# Patient Record
Sex: MALE | Race: WHITE
[De-identification: names, ages, dates, MRNs, and addresses within clinical notes are randomized per-mention and may not be internally consistent; named-entity substitution may affect disease eponyms.]

---

## 2017-11-16 ENCOUNTER — HOSPITAL ENCOUNTER (OUTPATIENT)
Dept: HOSPITAL 47 - EC | Age: 67
Setting detail: OBSERVATION
LOS: 1 days | Discharge: HOME | End: 2017-11-17
Attending: INTERNAL MEDICINE | Admitting: INTERNAL MEDICINE
Payer: MEDICARE

## 2017-11-16 DIAGNOSIS — Z79.82: ICD-10-CM

## 2017-11-16 DIAGNOSIS — M19.90: ICD-10-CM

## 2017-11-16 DIAGNOSIS — Z80.8: ICD-10-CM

## 2017-11-16 DIAGNOSIS — J45.909: ICD-10-CM

## 2017-11-16 DIAGNOSIS — Z79.899: ICD-10-CM

## 2017-11-16 DIAGNOSIS — M54.5: ICD-10-CM

## 2017-11-16 DIAGNOSIS — E78.00: ICD-10-CM

## 2017-11-16 DIAGNOSIS — R61: ICD-10-CM

## 2017-11-16 DIAGNOSIS — G89.29: ICD-10-CM

## 2017-11-16 DIAGNOSIS — Z88.8: ICD-10-CM

## 2017-11-16 DIAGNOSIS — F41.9: ICD-10-CM

## 2017-11-16 DIAGNOSIS — D72.829: ICD-10-CM

## 2017-11-16 DIAGNOSIS — Z87.891: ICD-10-CM

## 2017-11-16 DIAGNOSIS — Z83.3: ICD-10-CM

## 2017-11-16 DIAGNOSIS — F32.9: ICD-10-CM

## 2017-11-16 DIAGNOSIS — E66.01: ICD-10-CM

## 2017-11-16 DIAGNOSIS — N40.0: ICD-10-CM

## 2017-11-16 DIAGNOSIS — K21.9: ICD-10-CM

## 2017-11-16 DIAGNOSIS — M54.30: ICD-10-CM

## 2017-11-16 DIAGNOSIS — I10: ICD-10-CM

## 2017-11-16 DIAGNOSIS — Z86.73: ICD-10-CM

## 2017-11-16 DIAGNOSIS — R07.89: Primary | ICD-10-CM

## 2017-11-16 DIAGNOSIS — G25.81: ICD-10-CM

## 2017-11-16 DIAGNOSIS — E78.5: ICD-10-CM

## 2017-11-16 LAB
ALP SERPL-CCNC: 49 U/L (ref 38–126)
ALT SERPL-CCNC: 30 U/L (ref 21–72)
ANION GAP SERPL CALC-SCNC: 8 MMOL/L
APTT BLD: 24.4 SEC (ref 22–30)
AST SERPL-CCNC: 21 U/L (ref 17–59)
BASOPHILS # BLD AUTO: 0 K/UL (ref 0–0.2)
BASOPHILS NFR BLD AUTO: 0 %
BUN SERPL-SCNC: 13 MG/DL (ref 9–20)
CALCIUM SPEC-MCNC: 9.1 MG/DL (ref 8.4–10.2)
CH: 30.4
CHCM: 32.7
CHLORIDE SERPL-SCNC: 105 MMOL/L (ref 98–107)
CK SERPL-CCNC: 87 U/L (ref 55–170)
CK SERPL-CCNC: 92 U/L (ref 55–170)
CO2 SERPL-SCNC: 27 MMOL/L (ref 22–30)
EOSINOPHIL # BLD AUTO: 0.1 K/UL (ref 0–0.7)
EOSINOPHIL NFR BLD AUTO: 1 %
ERYTHROCYTE [DISTWIDTH] IN BLOOD BY AUTOMATED COUNT: 4.77 M/UL (ref 4.3–5.9)
ERYTHROCYTE [DISTWIDTH] IN BLOOD: 14.7 % (ref 11.5–15.5)
GLUCOSE SERPL-MCNC: 113 MG/DL (ref 74–99)
HCT VFR BLD AUTO: 44.6 % (ref 39–53)
HDW: 2.43
HGB BLD-MCNC: 14.3 GM/DL (ref 13–17.5)
INR PPP: 1.1 (ref ?–1.2)
LUC NFR BLD AUTO: 1 %
LYMPHOCYTES # SPEC AUTO: 1.4 K/UL (ref 1–4.8)
LYMPHOCYTES NFR SPEC AUTO: 11 %
MAGNESIUM SPEC-SCNC: 1.9 MG/DL (ref 1.6–2.3)
MCH RBC QN AUTO: 30 PG (ref 25–35)
MCHC RBC AUTO-ENTMCNC: 32.1 G/DL (ref 31–37)
MCV RBC AUTO: 93.5 FL (ref 80–100)
MONOCYTES # BLD AUTO: 1.1 K/UL (ref 0–1)
MONOCYTES NFR BLD AUTO: 8 %
NEUTROPHILS # BLD AUTO: 10.5 K/UL (ref 1.3–7.7)
NEUTROPHILS NFR BLD AUTO: 78 %
NON-AFRICAN AMERICAN GFR(MDRD): >60
POTASSIUM SERPL-SCNC: 3.7 MMOL/L (ref 3.5–5.1)
PROT SERPL-MCNC: 6.8 G/DL (ref 6.3–8.2)
PT BLD: 10.9 SEC (ref 9–12)
SODIUM SERPL-SCNC: 140 MMOL/L (ref 137–145)
TROPONIN I SERPL-MCNC: <0.012 NG/ML (ref 0–0.03)
TROPONIN I SERPL-MCNC: <0.012 NG/ML (ref 0–0.03)
WBC # BLD AUTO: 0.18 10*3/UL
WBC # BLD AUTO: 13.4 K/UL (ref 3.8–10.6)
WBC (PEROX): 13.5

## 2017-11-16 PROCEDURE — 85379 FIBRIN DEGRADATION QUANT: CPT

## 2017-11-16 PROCEDURE — 93005 ELECTROCARDIOGRAM TRACING: CPT

## 2017-11-16 PROCEDURE — 80053 COMPREHEN METABOLIC PANEL: CPT

## 2017-11-16 PROCEDURE — 82553 CREATINE MB FRACTION: CPT

## 2017-11-16 PROCEDURE — 93306 TTE W/DOPPLER COMPLETE: CPT

## 2017-11-16 PROCEDURE — 36415 COLL VENOUS BLD VENIPUNCTURE: CPT

## 2017-11-16 PROCEDURE — 96374 THER/PROPH/DIAG INJ IV PUSH: CPT

## 2017-11-16 PROCEDURE — 96375 TX/PRO/DX INJ NEW DRUG ADDON: CPT

## 2017-11-16 PROCEDURE — 80061 LIPID PANEL: CPT

## 2017-11-16 PROCEDURE — 85610 PROTHROMBIN TIME: CPT

## 2017-11-16 PROCEDURE — 82550 ASSAY OF CK (CPK): CPT

## 2017-11-16 PROCEDURE — 96376 TX/PRO/DX INJ SAME DRUG ADON: CPT

## 2017-11-16 PROCEDURE — 84484 ASSAY OF TROPONIN QUANT: CPT

## 2017-11-16 PROCEDURE — 71020: CPT

## 2017-11-16 PROCEDURE — 85025 COMPLETE CBC W/AUTO DIFF WBC: CPT

## 2017-11-16 PROCEDURE — 85730 THROMBOPLASTIN TIME PARTIAL: CPT

## 2017-11-16 PROCEDURE — 83735 ASSAY OF MAGNESIUM: CPT

## 2017-11-16 PROCEDURE — 99285 EMERGENCY DEPT VISIT HI MDM: CPT

## 2017-11-16 RX ADMIN — NITROGLYCERIN SCH: 20 OINTMENT TOPICAL at 17:51

## 2017-11-16 NOTE — ED
General Adult HPI





- General


Chief complaint: Chest Pain


Stated complaint: chest pain


Time Seen by Provider: 11/16/17 13:00


Source: patient, EMS, RN notes reviewed


Mode of arrival: EMS


Limitations: no limitations





- History of Present Illness


Initial comments: 





This is a 67-year-old male who presents to the emergency department with past 

medical history significant for angina hypertension high cholesterol.  Patient 

comes in today because he has had 3 episodes of chest pain.  Patient states 

chest pain lasted a few minutes each time and is very sweaty when it occurs.  

Patient denies any shortness of breath per patient denies radiation of the 

pain.  Patient denied nausea with the pain.  Patient states currently he is pain

-free.  Patient states some of this might be anxiety because his wife is in the 

hospital and he does not want to be home alone tonight.  Patient denies any 

recent fever chills or cough.  Patient denies any headache patient denies 

numbness weakness.  Patient denies any lightheadedness dizziness or near 

syncopal episode.  Patient denies any abdominal pain.  Patient denies any calf 

tenderness or leg swelling





- Related Data


 Home Medications











 Medication  Instructions  Recorded  Confirmed


 


Aspirin 81 mg PO DAILY 03/02/15 03/03/15


 


Cholecalciferol [Vitamin D3] 2,000 unit PO DAILY@1200 03/02/15 03/03/15


 


LORazepam [Ativan] 1 mg PO TID 03/02/15 03/03/15


 


Lisinopril 40 mg PO BID 03/02/15 03/03/15


 


PARoxetine [Paxil] 20 mg PO BID 03/02/15 03/03/15


 


Pantoprazole Sodium 40 mg PO BID 03/02/15 03/03/15


 


amLODIPine [Norvasc] 10 mg PO DAILY 03/02/15 03/03/15


 


rOPINIRole HCL [Requip] 2 mg PO HS 03/02/15 03/03/15


 


traMADol HCl [Ultram] 50 mg PO TID 03/02/15 03/03/15


 


Fenofibrate,Micronized 134 mg PO DAILY 03/03/15 03/03/15





[Fenofibrate]   


 


Sodium Chloride [Saline Nasal Mist] 1 spray EA NOSTRIL BID 03/03/15 03/03/15


 


Testosterone Cypionate 1 ml SQ Q14D 03/03/15 03/03/15





[Depo-Testosterone]   


 


Trolamine Salicylate [Aspercreme] 1 applic TOPICAL QID 03/03/15 03/03/15








 Previous Rx's











 Medication  Instructions  Recorded


 


cloNIDine HCL [Catapres] 0.1 mg PO BID #60 tab 03/03/15











 Allergies











Allergy/AdvReac Type Severity Reaction Status Date / Time


 


No Known Allergies Allergy   Verified 11/16/17 12:54














Review of Systems


ROS Statement: 


Those systems with pertinent positive or pertinent negative responses have been 

documented in the HPI.





ROS Other: All systems not noted in ROS Statement are negative.





Past Medical History


Past Medical History: Chest Pain / Angina, GERD/Reflux, Hyperlipidemia, 

Hypertension


Additional Past Medical History / Comment(s): Restless Leg Syndrome, sciatica, 

hernia


History of Any Multi-Drug Resistant Organisms: None Reported


Past Surgical History: Appendectomy, Cholecystectomy


Additional Past Surgical History / Comment(s): Cyst Removed from Right Hand; 

Broken Ankle,


Past Psychological History: Anxiety


Smoking Status: Former smoker


Past Alcohol Use History: Occasional


Past Drug Use History: None Reported





General Exam





- General Exam Comments


Initial Comments: 





GENERAL:


Patient is well-developed and well-nourished.  Patient is nontoxic and well-

hydrated and is in no acute distress.





ENT:


Neck is soft and supple.  No significant lymphadenopathy is noted.  Oropharynx 

is clear.  Moist mucous membranes.  Neck has full range of motion without 

eliciting any pain.





EYES:


The sclera were anicteric and conjunctiva were pink and moist.  Extraocular 

movements were intact and pupils were equal round and reactive to light.  

Eyelids were unremarkable.





PULMONARY:


Unlabored respirations.  Good breath sounds bilaterally.  No audible rales 

rhonchi or wheezing was noted.





CARDIOVASCULAR:


There is a regular rate and rhythm without any murmurs gallops or rubs.  





ABDOMEN:


Soft and nontender with normal bowel sounds.  No palpable organomegaly was 

noted.  There is no palpable pulsatile mass.





SKIN:


Skin is clear with no lesions or rashes and otherwise unremarkable.





NEUROLOGIC:


Patient is alert and oriented x3.  Cranial nerves II through XII are grossly 

intact.  Motor and sensory are also intact.  Normal speech, volume and content.

  Symmetrical smile.





MUSCULOSKELETAL:


Normal extremities with adequate strength and full range of motion.  No lower 

extremity swelling or edema.  No calf tenderness.





LYMPHATICS:


No significant lymphadenopathy is noted





PSYCHIATRIC:


Normal psychiatric evaluation. 


Limitations: no limitations





Course


 Vital Signs











  11/16/17 11/16/17





  12:54 14:03


 


Temperature 98.7 F 


 


Pulse Rate 66 59 L


 


Respiratory 20 18





Rate  


 


Blood Pressure 171/74 183/79


 


O2 Sat by Pulse 96 98





Oximetry  














Medical Decision Making





- Medical Decision Making





EKG shows normal sinus rhythm at 64 bpm IL interval 164 QRS is 84 Q-T intervals 

4:30 QTC is 443 per patient's EKG shows no ST segment elevation or depression 

or T wave abnormalities are noted.





Chest x-ray shows no acute abnormality.  New.  Patient is chest pain-free 

throughout his ED course.





I spoke with Dr. West he wants the patient admitted and have cardiology 

consult





- Lab Data


Result diagrams: 


 11/16/17 13:00





 11/16/17 13:00


 Lab Results











  11/16/17 11/16/17 11/16/17 Range/Units





  13:00 13:00 13:00 


 


WBC   13.4 H   (3.8-10.6)  k/uL


 


RBC   4.77   (4.30-5.90)  m/uL


 


Hgb   14.3   (13.0-17.5)  gm/dL


 


Hct   44.6   (39.0-53.0)  %


 


MCV   93.5   (80.0-100.0)  fL


 


MCH   30.0   (25.0-35.0)  pg


 


MCHC   32.1   (31.0-37.0)  g/dL


 


RDW   14.7   (11.5-15.5)  %


 


Plt Count   323   (150-450)  k/uL


 


Neutrophils %   78   %


 


Lymphocytes %   11   %


 


Monocytes %   8   %


 


Eosinophils %   1   %


 


Basophils %   0   %


 


Neutrophils #   10.5 H   (1.3-7.7)  k/uL


 


Lymphocytes #   1.4   (1.0-4.8)  k/uL


 


Monocytes #   1.1 H   (0-1.0)  k/uL


 


Eosinophils #   0.1   (0-0.7)  k/uL


 


Basophils #   0.0   (0-0.2)  k/uL


 


PT     (9.0-12.0)  sec


 


INR     (<1.2)  


 


APTT     (22.0-30.0)  sec


 


Sodium    140  (137-145)  mmol/L


 


Potassium    3.7  (3.5-5.1)  mmol/L


 


Chloride    105  ()  mmol/L


 


Carbon Dioxide    27  (22-30)  mmol/L


 


Anion Gap    8  mmol/L


 


BUN    13  (9-20)  mg/dL


 


Creatinine    0.98  (0.66-1.25)  mg/dL


 


Est GFR (MDRD) Af Amer    >60  (>60 ml/min/1.73 sqM)  


 


Est GFR (MDRD) Non-Af    >60  (>60 ml/min/1.73 sqM)  


 


Glucose    113 H  (74-99)  mg/dL


 


Calcium    9.1  (8.4-10.2)  mg/dL


 


Magnesium    1.9  (1.6-2.3)  mg/dL


 


Total Bilirubin    0.3  (0.2-1.3)  mg/dL


 


AST    21  (17-59)  U/L


 


ALT    30  (21-72)  U/L


 


Alkaline Phosphatase    49  ()  U/L


 


Total Creatine Kinase  92    ()  U/L


 


CK-MB (CK-2)  1.7    (0.0-2.4)  ng/mL


 


CK-MB (CK-2) Rel Index  1.8    


 


Troponin I  <0.012    (0.000-0.034)  ng/mL


 


Total Protein    6.8  (6.3-8.2)  g/dL


 


Albumin    3.7  (3.5-5.0)  g/dL














  11/16/17 Range/Units





  13:00 


 


WBC   (3.8-10.6)  k/uL


 


RBC   (4.30-5.90)  m/uL


 


Hgb   (13.0-17.5)  gm/dL


 


Hct   (39.0-53.0)  %


 


MCV   (80.0-100.0)  fL


 


MCH   (25.0-35.0)  pg


 


MCHC   (31.0-37.0)  g/dL


 


RDW   (11.5-15.5)  %


 


Plt Count   (150-450)  k/uL


 


Neutrophils %   %


 


Lymphocytes %   %


 


Monocytes %   %


 


Eosinophils %   %


 


Basophils %   %


 


Neutrophils #   (1.3-7.7)  k/uL


 


Lymphocytes #   (1.0-4.8)  k/uL


 


Monocytes #   (0-1.0)  k/uL


 


Eosinophils #   (0-0.7)  k/uL


 


Basophils #   (0-0.2)  k/uL


 


PT  10.9  (9.0-12.0)  sec


 


INR  1.1  (<1.2)  


 


APTT  24.4  (22.0-30.0)  sec


 


Sodium   (137-145)  mmol/L


 


Potassium   (3.5-5.1)  mmol/L


 


Chloride   ()  mmol/L


 


Carbon Dioxide   (22-30)  mmol/L


 


Anion Gap   mmol/L


 


BUN   (9-20)  mg/dL


 


Creatinine   (0.66-1.25)  mg/dL


 


Est GFR (MDRD) Af Amer   (>60 ml/min/1.73 sqM)  


 


Est GFR (MDRD) Non-Af   (>60 ml/min/1.73 sqM)  


 


Glucose   (74-99)  mg/dL


 


Calcium   (8.4-10.2)  mg/dL


 


Magnesium   (1.6-2.3)  mg/dL


 


Total Bilirubin   (0.2-1.3)  mg/dL


 


AST   (17-59)  U/L


 


ALT   (21-72)  U/L


 


Alkaline Phosphatase   ()  U/L


 


Total Creatine Kinase   ()  U/L


 


CK-MB (CK-2)   (0.0-2.4)  ng/mL


 


CK-MB (CK-2) Rel Index   


 


Troponin I   (0.000-0.034)  ng/mL


 


Total Protein   (6.3-8.2)  g/dL


 


Albumin   (3.5-5.0)  g/dL














Disposition


Clinical Impression: 


 Chest pain





Disposition: ADMITTED AS IP TO THIS HOSP


Referrals: 


Justice Juan DO [Primary Care Provider] - 1-2 days


Time of Disposition: 14:21

## 2017-11-16 NOTE — XR
EXAMINATION TYPE: XR chest 2V

 

DATE OF EXAM: 11/16/2017

 

COMPARISON: 3/2/2015

 

INDICATION: Chest pain

 

TECHNIQUE:  Frontal and lateral views of the chest are obtained.

 

FINDINGS:  

The heart size is normal.  

The pulmonary vasculature is normal.

The lungs are clear.

 

IMPRESSION:  

1. No acute pulmonary process.

## 2017-11-16 NOTE — P.HPIM
History of Present Illness


14-year-old male came in today with his 1 and complaints of low back pain 

radiating to the back of legs.  Patient denied any weakness denied loss of 

bowel or bladder continence patient was complaining of chest pain in the left 

side of the chest happened nature lasted for 4 minutes about 6 x 10 in severity 

nonradiating patient is morbidly obese appears to have an anxiety episode give 

me a questionable history of nausea denied and diaphoresis took aspirin which 

helped his pain he believes.  Patient's wife was admitted to my service earlier 

today.  Patient denied any fever chills patient any cough runny nose.  EKG did 

not show any significant acute ST-T wave changes patient is morbidly obese 

never tested for sleep apnea, his chest pain is nonpleuritic completely 

resolved at this point of time not associated with food.








Review of Systems


REVIEW OF SYSTEMS: 


CONSTITUTIONAL: No fever, no malaise, no fatigue. 


HEENT: No recent visual problems or hearing problems. Denied any sore throat. 


CARDIOVASCULAR: No orthopnea, PND, no palpitations, no syncope. 


PULMONARY: No shortness of breath, no cough, no hemoptysis. 


GASTROINTESTINAL: No diarrhea, no nausea, no vomiting, no abdominal pain. 

Normoactive bowel sounds. 


NEUROLOGICAL: No headaches, no weakness, no numbness. 


HEMATOLOGICAL: Denies any bleeding or petechiae. 


GENITOURINARY: Denies any burning micturition, frequency, or urgency. 


MUSCULOSKELETAL/RHEUMATOLOGICAL: Denies any joint pain, swelling, or any muscle 

pain. 


ENDOCRINE: Denies any polyuria or polydipsia. 





The rest of the 14-point review of systems is negative.











Past Medical History


Past Medical History: Chest Pain / Angina, GERD/Reflux, Hyperlipidemia, 

Hypertension


Additional Past Medical History / Comment(s): Restless Leg Syndrome, sciatica, 

hernia


History of Any Multi-Drug Resistant Organisms: None Reported


Past Surgical History: Appendectomy, Cholecystectomy


Additional Past Surgical History / Comment(s): Cyst Removed from Right Hand; 

Broken Ankle,


Past Psychological History: Anxiety


Smoking Status: Former smoker


Past Alcohol Use History: Occasional


Past Drug Use History: None Reported





Medications and Allergies


 Home Medications











 Medication  Instructions  Recorded  Confirmed  Type


 


LORazepam [Ativan] 1 mg PO TID 03/02/15 11/16/17 History


 


Lisinopril 40 mg PO BID 03/02/15 11/16/17 History


 


PARoxetine [Paxil] 20 mg PO BID 03/02/15 11/16/17 History


 


amLODIPine [Norvasc] 10 mg PO DAILY 03/02/15 11/16/17 History


 


traMADol HCl [Ultram] 50 mg PO TID 03/02/15 11/16/17 History


 


Fenofibrate,Micronized 134 mg PO DAILY 03/03/15 11/16/17 History





[Fenofibrate]    


 


Cetirizine HCl [Zyrtec] 10 mg PO DAILY 11/16/17 11/16/17 History


 


Oxybutynin Chloride [Oxybutynin 15 mg PO DAILY 11/16/17 11/16/17 History





Chloride ER]    


 


Ranitidine HCl [Zantac] 300 mg PO HS 11/16/17 11/16/17 History


 


Tamsulosin HCl [Flomax] 0.8 mg PO DAILY 11/16/17 11/16/17 History











 Allergies











Allergy/AdvReac Type Severity Reaction Status Date / Time


 


atorvastatin [From Lipitor] Allergy  Unknown Verified 11/16/17 14:55














Physical Exam


Vitals: 


 Vital Signs











  Temp Pulse Resp BP Pulse Ox


 


 11/16/17 14:03   59 L  18  183/79  98


 


 11/16/17 12:54  98.7 F  66  20  171/74  96








 Intake and Output











 11/16/17 11/16/17 11/16/17





 06:59 14:59 22:59


 


Other:   


 


  Weight  117.934 kg 








 Patient Weight











 11/17/17





 06:59


 


Weight 117.934 kg











PHYSICAL EXAMINATION: 





GENERAL: The patient is alert and oriented x3, not in any acute distress. Well 

developed, well nourished. 


HEENT: Pupils are round and equally reacting to light. EOMI. No scleral 

icterus. No conjunctival pallor. Normocephalic, atraumatic. No pharyngeal 

erythema. No thyromegaly. 


CARDIOVASCULAR: S1 and S2 present. No murmurs, rubs, or gallops. 


PULMONARY: Chest is clear to auscultation, no wheezing or crackles. 


ABDOMEN: Soft, nontender, nondistended, normoactive bowel sounds. No palpable 

organomegaly. 


MUSCULOSKELETAL: No joint swelling or deformity.


EXTREMITIES: No cyanosis, clubbing, or pedal edema. 


NEUROLOGICAL: Gross neurological examination did not reveal any focal deficits. 


SKIN: No rashes. 











Results


CBC & Chem 7: 


 11/16/17 13:00





 11/16/17 13:00


Labs: 


 Abnormal Lab Results - Last 24 Hours (Table)











  11/16/17 11/16/17 Range/Units





  13:00 13:00 


 


WBC  13.4 H   (3.8-10.6)  k/uL


 


Neutrophils #  10.5 H   (1.3-7.7)  k/uL


 


Monocytes #  1.1 H   (0-1.0)  k/uL


 


Glucose   113 H  (74-99)  mg/dL














Assessment and Plan


Plan: 


#1 chest pain: Patient will admitted to rule out a acute coronary syndromes 

will repeat 2 more sets of troponins, EKG, cardiology was consulted.


#2 morbid obesity: Counseling was provided


#3 chronic low back pain without any red flag signs patient will continue his 

pain medications and physical therapy as an outpatient.


#4 hypertension


#5 depression


#6 gastroesophageal reflux disease


#7 hyperlipidemia


#8 leukocytosis: Reactive without any signs or symptoms of infection


#9 benign prostatic hypertrophy





For his chronic medical problems patient will be continued on appropriate home 

medications

## 2017-11-17 VITALS — TEMPERATURE: 97.1 F

## 2017-11-17 VITALS — HEART RATE: 88 BPM | DIASTOLIC BLOOD PRESSURE: 77 MMHG | SYSTOLIC BLOOD PRESSURE: 169 MMHG | RESPIRATION RATE: 16 BRPM

## 2017-11-17 LAB
CHOLEST SERPL-MCNC: 168 MG/DL (ref ?–200)
CK SERPL-CCNC: 93 U/L (ref 55–170)
HDLC SERPL-MCNC: 47 MG/DL (ref 40–60)
TROPONIN I SERPL-MCNC: <0.012 NG/ML (ref 0–0.03)

## 2017-11-17 RX ADMIN — NITROGLYCERIN SCH: 20 OINTMENT TOPICAL at 06:20

## 2017-11-17 RX ADMIN — NITROGLYCERIN SCH: 20 OINTMENT TOPICAL at 06:19

## 2017-11-17 RX ADMIN — NITROGLYCERIN SCH: 20 OINTMENT TOPICAL at 12:14

## 2017-11-17 NOTE — ECHOF
Referral Reason:chest pain



MEASUREMENTS

--------

HEIGHT: 160.0 cm

WEIGHT: 119.3 kg

BP: 

RVIDd:   3.8 cm     (< 3.3)

IVSd:   1.6 cm     (0.6 - 1.1)

LVIDd:   4.7 cm     (3.9 - 5.3)

LVPWd:   0.9 cm     (0.6 - 1.1)

IVSs:   1.4 cm

LVIDs:   3.2 cm

LVPWs:   0.9 cm

LAESV Index (A-L):   23.87 ml/m

Ao Diam:   3.0 cm     (2.0 - 3.7)

LA Diam:   3.5 cm     (2.7 - 3.8)

MV E Kamar:   0.51 m/s

MV DecT:   129 ms

MV A Kamar:   0.95 m/s

MV E/A Ratio:   0.54 

RAP:   5.00 mmHg

RVSP:   15.76 mmHg







FINDINGS

--------

Sinus rhythm.

Morbid Obesity   This was a techncally difficult study with suboptimal views, , Definity utilized for
 enhancement of images.

There is mild concentric left ventricular hypertrophy.   Overall left ventricular systolic function i
s low-normal with, an EF between 50 - 55 %.

The right ventricle is normal in size.

Normal LA  size by volume 22+/-6 ml/m2.

The right atrial size is normal.

The aortic valve was not well visualized.

Mild mitral regurgitation is present.

The tricuspid valve was not well visualized.   The right ventricular systolic pressure, as measured b
y Doppler, is 15.76mmHg.

The pulmonic valve was not well visualized.

The aortic root size is normal.

Echo free space indicative of a pericardial fat pad.



CONCLUSIONS

--------

1. Morbid Obesity

2. This was a techncally difficult study with suboptimal views, , Definity utilized for enhancement o
f images.

3. There is mild concentric left ventricular hypertrophy.

4. Overall left ventricular systolic function is low-normal with, an EF between 50 - 55 %.

5. The right ventricle is normal in size.

6. The aortic valve was not well visualized.

7. The tricuspid valve was not well visualized.

8. The right ventricular systolic pressure, as measured by Doppler, is 15.76mmHg.

9. The pulmonic valve was not well visualized.

10. The aortic root size is normal.

11. Echo free space indicative of a pericardial fat pad.





SONOGRAPHER: Karen Paul RDCS

## 2017-11-17 NOTE — P.CRDCN
History of Present Illness


Consult date: 17


Requesting physician: Scott West


Consult reason: chest pain


Chief complaint: Chest pain


History of present illness: 


This is a 67-year-old  gentleman with history of hypertension, 

hyperlipidemia, obesity, who unfortunately just lost his wife this morning, he 

presented to the hospital yesterday with symptoms of chest discomfort.  He 

describes his discomfort as a poking sensation in his left upper chest area 

that comes and goes.  Urology consultation was requested for this as well as 

accelerated hypertension.  EKG on arrival here showed a normal sinus rhythm 

with nonspecific ST-T wave changes.  Chest x-ray did not reveal any acute 

cardiopulmonary process.  The pressure on arrival 170/70 with a heart rate in 

the 60s, 96% on room air.  The pressure this morning 178/80 with a heart rate 

in the 90s, temperature 97.5.  White blood cell count 13.4, hemoglobin 14.3, 

potassium 3.7, BUN 13, troponins have been negative 3.  creatinine 0.9.  

Cholesterol 168, , triglycerides 89, HDL 47.  As mentioned previously in 

this note, patient's wife  this morning, he is quite emotional at the 

time of my examination, quite eager to be discharged home to be with family.








Past Medical History


Past Medical History: Asthma, Chest Pain / Angina, CVA/TIA, GERD/Reflux, 

Hyperlipidemia, Hypertension, Osteoarthritis (OA)


Additional Past Medical History / Comment(s): Restless Leg Syndrome, sciatica, 

hiatal hernia, tia 8 years ago, past ulcers, past conscussion when younger and 

played football.


History of Any Multi-Drug Resistant Organisms: None Reported


Past Surgical History: Appendectomy, Cholecystectomy, Heart Catheterization, 

Tonsillectomy


Additional Past Surgical History / Comment(s): Cyst Removed from Right Hand; 

Broken Ankle, colonoscopy/polypectomy(benign)


Past Anesthesia/Blood Transfusion Reactions: Motion Sickness


Additional Past Anesthesia/Blood Transfusion Reaction / Comment(s): 

clausterphobia


Smoking Status: Former smoker





- Past Family History


  ** Mother


Family Medical History: Diabetes Mellitus


Additional Family Medical History / Comment(s):  at age 86





  ** Father


Family Medical History: Cancer


Additional Family Medical History / Comment(s): throat cancer - age 50





Medications and Allergies


 Home Medications











 Medication  Instructions  Recorded  Confirmed  Type


 


LORazepam [Ativan] 1 mg PO TID 03/02/15 11/16/17 History


 


Lisinopril 40 mg PO BID 03/02/15 11/16/17 History


 


PARoxetine [Paxil] 20 mg PO BID 03/02/15 11/16/17 History


 


amLODIPine [Norvasc] 10 mg PO DAILY 03/02/15 11/16/17 History


 


traMADol HCl [Ultram] 50 mg PO TID 03/02/15 11/16/17 History


 


Fenofibrate,Micronized 134 mg PO DAILY 03/03/15 11/16/17 History





[Fenofibrate]    


 


Cetirizine HCl [Zyrtec] 10 mg PO DAILY 17 History


 


Oxybutynin Chloride [Oxybutynin 15 mg PO DAILY 17 History





Chloride ER]    


 


Ranitidine HCl [Zantac] 300 mg PO HS 17 History


 


Tamsulosin HCl [Flomax] 0.8 mg PO DAILY 17 History











 Allergies











Allergy/AdvReac Type Severity Reaction Status Date / Time


 


atorvastatin [From Lipitor] Allergy  Unknown Verified 17 14:55














Physical Exam


Vitals: 


 Vital Signs











  Temp Pulse Pulse Resp BP BP Pulse Ox


 


 17 07:30  97.1 F L   80  20   194/93  94 L


 


 17 03:40    98  18   179/81  92 L


 


 17 02:40    72  18   190/85  96


 


 17 00:00  97.5 F L   79  18   208/79  96


 


 17 20:00  97.3 F L   64  18   182/77  96


 


 17 18:19       142/63 


 


 17 16:55  97.1 F L   82  16   172/75  97


 


 17 16:13  98.5 F      


 


 17 16:00   77   18  168/66   95


 


 17 15:33   62   18  190/80   98


 


 17 15:00   62   18  200/87   98


 


 17 14:03   59 L   18  183/79   98


 


 17 12:54  98.7 F  66   20  171/74   96








 Intake and Output











 17





 22:59 06:59 14:59


 


Intake Total 222  


 


Output Total  400 


 


Balance 222 -400 


 


Intake:   


 


  Oral 222  


 


Output:   


 


  Urine  400 


 


Other:   


 


  Voiding Method Toilet Toilet 


 


  # Voids 1 2 


 


  Weight  119.4 kg 














PHYSICAL EXAMINATION: 





HEENT: Head is atraumatic, normocephalic.  Pupils equal, round.  Neck is 

supple.  There is no elevated jugular venous pressure.





HEART EXAMINATION: Heart S1, S2 normal.  No murmur or gallop heard.





CHEST EXAMINATION: Lungs are clear to auscultation and precussion. No chest 

wall tenderness is noted on palpation or with deep breathing.





ABDOMEN:  Soft, obese, nontender. Bowel sounds are heard. No organomegaly noted.


 


EXTREMITIES:[ 2+ peripheral pulses with no evidence of peripheral edema and no 

calf tenderness noted].





NEUROLOGIC [patient is awake, alert and oriented -3.]


 


.


 








Results





 17 13:00





 17 13:00


 Cardiac Enzymes











  17 Range/Units





  13:00 13:00 18:46 


 


AST   21   (17-59)  U/L


 


CK-MB (CK-2)  1.7   1.4  (0.0-2.4)  ng/mL


 


Troponin I  <0.012   <0.012  (0.000-0.034)  ng/mL














  17 Range/Units





  00:27 


 


AST   (17-59)  U/L


 


CK-MB (CK-2)  1.9  (0.0-2.4)  ng/mL


 


Troponin I  <0.012  (0.000-0.034)  ng/mL








 Coagulation











  17 Range/Units





  13:00 


 


PT  10.9  (9.0-12.0)  sec


 


APTT  24.4  (22.0-30.0)  sec








 Lipids











  17 Range/Units





  00:27 


 


Triglycerides  89  (<150)  mg/dL


 


Cholesterol  168  (<200)  mg/dL


 


HDL Cholesterol  47  (40-60)  mg/dL








 CBC











  17 Range/Units





  13:00 


 


WBC  13.4 H  (3.8-10.6)  k/uL


 


RBC  4.77  (4.30-5.90)  m/uL


 


Hgb  14.3  (13.0-17.5)  gm/dL


 


Hct  44.6  (39.0-53.0)  %


 


Plt Count  323  (150-450)  k/uL








 Comprehensive Metabolic Panel











  17 Range/Units





  13:00 


 


Sodium  140  (137-145)  mmol/L


 


Potassium  3.7  (3.5-5.1)  mmol/L


 


Chloride  105  ()  mmol/L


 


Carbon Dioxide  27  (22-30)  mmol/L


 


BUN  13  (9-20)  mg/dL


 


Creatinine  0.98  (0.66-1.25)  mg/dL


 


Glucose  113 H  (74-99)  mg/dL


 


Calcium  9.1  (8.4-10.2)  mg/dL


 


AST  21  (17-59)  U/L


 


ALT  30  (21-72)  U/L


 


Alkaline Phosphatase  49  ()  U/L


 


Total Protein  6.8  (6.3-8.2)  g/dL


 


Albumin  3.7  (3.5-5.0)  g/dL








 Current Medications











Generic Name Dose Route Start Last Admin





  Trade Name Freq  PRN Reason Stop Dose Admin


 


Amlodipine Besylate  10 mg  17 09:00  





  Norvasc  PO   





  DAILY [facility]   


 


Aspirin  81 mg  17 09:00  





  Aspirin  PO   





  DAILY [facility]   


 


Famotidine  40 mg  17 21:00  17 22:01





  Pepcid  PO   40 mg





  [facility]   Administration


 


Fenofibrate  160 mg  17 09:00  





  Lofibra  PO   





  DAILY [facility]   


 


Hydromorphone HCl  0.5 mg  17 01:16  17 01:22





  Dilaudid Syringe  IVP   0.5 mg





  Q6HR PRN   Administration





  Moderate to Severe Pain   


 


Lisinopril  40 mg  17 21:00  17 22:01





  Zestril  PO   40 mg





  BID [facility]   Administration


 


Loratadine  10 mg  17 09:00  





  Claritin  PO   





  DAILY [facility]   


 


Lorazepam  1 mg  17 15:09  17 07:27





  Ativan  PO   1 mg





  TID PRN   Administration





  Anxiety   


 


Nitroglycerin  1 inch  17 18:00  17 06:20





  Nitro-Bid Oint  TOPICAL   Not Given





  Q6HR [facility]   


 


Nitroglycerin  0.4 mg  17 14:23  





  Nitrostat  SUBLINGUAL   





  Q5M PRN   





  Chest Pain   


 


Oxybutynin Chloride  15 mg  17 09:00  





  Ditropan Xl  PO   





  DAILY [facility]   


 


Paroxetine HCl  20 mg  17 21:00  17 22:02





  Paxil  PO   20 mg





  BID [facility]   Administration


 


Tamsulosin HCl  0.8 mg  17 09:00  





  Flomax  PO   





  DAILY [facility]   


 


Tramadol HCl  50 mg  17 16:00  17 07:28





  Ultram  PO   50 mg





  TID [facility]   Administration








 Intake and Output











 17





 22:59 06:59 14:59


 


Intake Total 222  


 


Output Total  400 


 


Balance 222 -400 


 


Intake:   


 


  Oral 222  


 


Output:   


 


  Urine  400 


 


Other:   


 


  Voiding Method Toilet Toilet 


 


  # Voids 1 2 


 


  Weight  119.4 kg 








 





 17 13:00 





 17 13:00 











EKG Interpretations (text)





EKG shows a normal sinus rhythm with nonspecific ST-T wave changes.





Assessment and Plan


Plan: 


Assessment and plan


#1 chest pain, atypical in nature, EKG shows normal sinus rhythm with no acute 

changes.  Troponins are negative 3.  Patient did have a stress test performed 

in  that was negative for any reversible ischemia, he also had an echo 

performed at that time which revealed a normal left ventricular systolic 

function.


#2 accelerated hypertension


#3 history of hypertension


#4 hyperlipidemia


# 5 obesity


#6 GERD


#7 history of sciatica








Plan


We will request an echocardiogram with Doppler study be performed, we will also 

make medication adjustments to get more optimal blood pressure control.  

Patient is quite eager to be discharged home to be with his family as his wife 

just  this morning.  Recommend discharging the patient home after 

adjustments have been made in his blood pressure medications, he may have an 

outpatient stress test down the road in the next couple of weeks.  Further 

recommendations to follow.








DNP note has been reviewed, I agree with a documented findings and plan of 

care.  Patient was seen and examined.

## 2017-11-17 NOTE — P.DS
Providers


Date of admission: 


11/16/17 14:24





Attending physician: 


Scott West





Consults: 





 





11/16/17 14:23


Consult Physician Urgent 


   Consulting Provider: [facility]


   Consult Reason/Comments: Chest pain


   Do you want consulting provider notified?: Yes











Primary care physician: 


Justice Juan





Hospital Course: 


Was admitted for chest pain rule out acute coronary syndromes patient was 

evaluated by cardiology patient chest pain is most related to anxiety episode 

patient has a significant life event, spousal demise.  And ALLERGIES 

recommending outpatient stress us patient blood pressures elevated and I'm 

adding Coreg to his regimen patient will be discharged today and will get 

outpatient stress test please refer to my dictation of H&P for further details 

of his other medical problems and hospitalization course








Plan - Discharge Summary


Discharge Rx Participant: No


New Discharge Prescriptions: 


New


   Carvedilol [Coreg] 6.25 mg PO BID #60 tablet


   Lisinopril [Zestril] 20 mg PO BID  tab





Continue


   PARoxetine [Paxil] 20 mg PO BID


   LORazepam [Ativan] 1 mg PO TID


   amLODIPine [Norvasc] 10 mg PO DAILY


   traMADol HCl [Ultram] 50 mg PO TID


   Fenofibrate,Micronized [Fenofibrate] 134 mg PO DAILY


   Cetirizine HCl [Zyrtec] 10 mg PO DAILY


   Oxybutynin Chloride [Oxybutynin Chloride ER] 15 mg PO DAILY


   Ranitidine HCl [Zantac] 300 mg PO HS


   Tamsulosin HCl [Flomax] 0.8 mg PO DAILY





Discontinued


   Lisinopril 40 mg PO BID


Discharge Medication List





LORazepam [Ativan] 1 mg PO TID 03/02/15 [History]


PARoxetine [Paxil] 20 mg PO BID 03/02/15 [History]


amLODIPine [Norvasc] 10 mg PO DAILY 03/02/15 [History]


traMADol HCl [Ultram] 50 mg PO TID 03/02/15 [History]


Fenofibrate,Micronized [Fenofibrate] 134 mg PO DAILY 03/03/15 [History]


Cetirizine HCl [Zyrtec] 10 mg PO DAILY 11/16/17 [History]


Oxybutynin Chloride [Oxybutynin Chloride ER] 15 mg PO DAILY 11/16/17 [History]


Ranitidine HCl [Zantac] 300 mg PO HS 11/16/17 [History]


Tamsulosin HCl [Flomax] 0.8 mg PO DAILY 11/16/17 [History]


Carvedilol [Coreg] 6.25 mg PO BID #60 tablet 11/17/17 [Rx]


Lisinopril [Zestril] 20 mg PO BID  tab 11/17/17 [Rx]








Follow up Appointment(s)/Referral(s): 


Raphael Madera MD [STAFF PHYSICIAN] - 2 Weeks


(Follow up for planned outpatient stress test.


Offic will call with an appointment date and time. )


Justice Juan DO [Primary Care Provider] - 11/22/17 10:20 am


Patient Instructions/Handouts:  Angina (DC)


Discharge Disposition: HOME SELF-CARE

## 2018-02-01 ENCOUNTER — HOSPITAL ENCOUNTER (OUTPATIENT)
Dept: HOSPITAL 47 - CATHCVL | Age: 68
Discharge: HOME | End: 2018-02-01
Payer: MEDICARE

## 2018-02-01 VITALS
TEMPERATURE: 98.4 F | DIASTOLIC BLOOD PRESSURE: 71 MMHG | RESPIRATION RATE: 16 BRPM | SYSTOLIC BLOOD PRESSURE: 124 MMHG | HEART RATE: 68 BPM

## 2018-02-01 VITALS — BODY MASS INDEX: 37.8 KG/M2

## 2018-02-01 DIAGNOSIS — Z79.52: ICD-10-CM

## 2018-02-01 DIAGNOSIS — R94.39: Primary | ICD-10-CM

## 2018-02-01 DIAGNOSIS — Z79.899: ICD-10-CM

## 2018-02-01 DIAGNOSIS — R07.89: ICD-10-CM

## 2018-02-01 DIAGNOSIS — E78.00: ICD-10-CM

## 2018-02-01 DIAGNOSIS — E66.9: ICD-10-CM

## 2018-02-01 DIAGNOSIS — Z87.891: ICD-10-CM

## 2018-02-01 DIAGNOSIS — I10: ICD-10-CM

## 2018-02-01 DIAGNOSIS — Z79.82: ICD-10-CM

## 2018-02-01 LAB
ANION GAP SERPL CALC-SCNC: 8 MMOL/L
BASOPHILS # BLD AUTO: 0 K/UL (ref 0–0.2)
BASOPHILS NFR BLD AUTO: 1 %
BUN SERPL-SCNC: 22 MG/DL (ref 9–20)
CALCIUM SPEC-MCNC: 9.4 MG/DL (ref 8.4–10.2)
CHLORIDE SERPL-SCNC: 100 MMOL/L (ref 98–107)
CO2 SERPL-SCNC: 31 MMOL/L (ref 22–30)
EOSINOPHIL # BLD AUTO: 0.1 K/UL (ref 0–0.7)
EOSINOPHIL NFR BLD AUTO: 1 %
ERYTHROCYTE [DISTWIDTH] IN BLOOD BY AUTOMATED COUNT: 4.15 M/UL (ref 4.3–5.9)
ERYTHROCYTE [DISTWIDTH] IN BLOOD: 14 % (ref 11.5–15.5)
GLUCOSE SERPL-MCNC: 109 MG/DL (ref 74–99)
HCT VFR BLD AUTO: 38.9 % (ref 39–53)
HGB BLD-MCNC: 12.6 GM/DL (ref 13–17.5)
LYMPHOCYTES # SPEC AUTO: 1.6 K/UL (ref 1–4.8)
LYMPHOCYTES NFR SPEC AUTO: 19 %
MCH RBC QN AUTO: 30.4 PG (ref 25–35)
MCHC RBC AUTO-ENTMCNC: 32.5 G/DL (ref 31–37)
MCV RBC AUTO: 93.6 FL (ref 80–100)
MONOCYTES # BLD AUTO: 0.6 K/UL (ref 0–1)
MONOCYTES NFR BLD AUTO: 7 %
NEUTROPHILS # BLD AUTO: 6 K/UL (ref 1.3–7.7)
NEUTROPHILS NFR BLD AUTO: 70 %
PLATELET # BLD AUTO: 303 K/UL (ref 150–450)
POTASSIUM SERPL-SCNC: 4.1 MMOL/L (ref 3.5–5.1)
SODIUM SERPL-SCNC: 139 MMOL/L (ref 137–145)
WBC # BLD AUTO: 8.5 K/UL (ref 3.8–10.6)

## 2018-02-01 PROCEDURE — 93458 L HRT ARTERY/VENTRICLE ANGIO: CPT

## 2018-02-01 PROCEDURE — 85025 COMPLETE CBC W/AUTO DIFF WBC: CPT

## 2018-02-01 PROCEDURE — 80048 BASIC METABOLIC PNL TOTAL CA: CPT

## 2018-02-01 NOTE — P.PCN
Date of Procedure: 02/01/18


Postoperative Diagnosis: 


Chest pain and positive stress test


Procedure(s) Performed: 


Left heart catheterization without left ventricular tomography


Description of Procedure: 


HISTORY: This is a 67-year-old gentleman with history of hypertension, 

hypercholesteremia and obesity who has been having chest pains.  Stress test 

was performed which showed reversible ischemia in the inferior segments.  

Patient is advised to have cardiac catheterization for definitive diagnosis.





CONSENT:I have discussed the risks, benefits and alternative therapies for the 

above-mentioned procedure and for both sedation/analgesia as well as necessary 

blood product administration, if indicated, as they pertain to this patient.  

The patient has indicated understanding and acceptance of the risks and 

procedures discussed.











PROCEDURE: Patient was brought to the lab in a fasting state.  Patient was 

given some IV sedation.  The right groin is infiltrated with lidocaine and 

right femoral artery was entered using Seldinger technique.  A 6-Czech 

catheter was left in place and selective coronary arteriography and left 

ventriculography was performed.  Patient tolerated the procedure well.  Femoral 

angiogram was performed and Angio-Seal was applied for hemostasis.  No 

immediate complications were noted and patient was transferred to [facility] in a 

stable condition





Conscious Sedation: Versed 1 mg


Fentanyl 50 g


Duration 31minutes   


HEMODYNAMICS: The aortic pressure is about 130/80.  Left ankle end-diastolic 

pressure is 16-20.  There was no gradient across the aortic valve





SELECTIVE CORONARY ARTERIOGRAPHY: 


                          LEFT MAIN: Normal length and free of any occlusive 

disease


                          THE LEFT ANTERIOR DESCENDING CORONARY ARTERY:.  This 

is a good caliber vessel giving rise to small septal and diagonal branches.  

The LAD and branches are free of occlusive disease


                          THE LEFT CIRCUMFLEX AND IS CORONARY ARTERY:.  This is 

a good caliber vessel giving rise to good-sized OM branch and also PDA.  Seems 

to be dominant vessel.  Free of any occlusive disease


                          THE RIGHT CORONARY ARTERY:.  This is a codominant 

vessel and good caliber, giving rise to PDA and PLV.  Free of any occlusive 

disease





      





LEFT VENTRICULOGRAPHY:.  Not performed





FINAL IMPRESSION:, Normal coronary arteries.  Mildly elevated end-diastolic 

pressures





PLAN: Maximum medical therapy and this factor modification





PROGNOSIS:.  Fair

## 2018-05-30 ENCOUNTER — HOSPITAL ENCOUNTER (INPATIENT)
Dept: HOSPITAL 47 - EC | Age: 68
LOS: 3 days | Discharge: NURSING, REHAB OR LONG-TERM CARE | DRG: 92 | End: 2018-06-02
Payer: MEDICARE

## 2018-05-30 DIAGNOSIS — T50.2X5A: ICD-10-CM

## 2018-05-30 DIAGNOSIS — T42.8X5A: ICD-10-CM

## 2018-05-30 DIAGNOSIS — Z79.51: ICD-10-CM

## 2018-05-30 DIAGNOSIS — Z88.8: ICD-10-CM

## 2018-05-30 DIAGNOSIS — F41.9: ICD-10-CM

## 2018-05-30 DIAGNOSIS — Z80.8: ICD-10-CM

## 2018-05-30 DIAGNOSIS — K44.9: ICD-10-CM

## 2018-05-30 DIAGNOSIS — M19.91: ICD-10-CM

## 2018-05-30 DIAGNOSIS — J44.9: ICD-10-CM

## 2018-05-30 DIAGNOSIS — E66.01: ICD-10-CM

## 2018-05-30 DIAGNOSIS — Z87.891: ICD-10-CM

## 2018-05-30 DIAGNOSIS — I83.90: ICD-10-CM

## 2018-05-30 DIAGNOSIS — G92: Primary | ICD-10-CM

## 2018-05-30 DIAGNOSIS — Z79.1: ICD-10-CM

## 2018-05-30 DIAGNOSIS — G25.81: ICD-10-CM

## 2018-05-30 DIAGNOSIS — Z79.891: ICD-10-CM

## 2018-05-30 DIAGNOSIS — T42.6X5A: ICD-10-CM

## 2018-05-30 DIAGNOSIS — K21.9: ICD-10-CM

## 2018-05-30 DIAGNOSIS — M51.27: ICD-10-CM

## 2018-05-30 DIAGNOSIS — N17.9: ICD-10-CM

## 2018-05-30 DIAGNOSIS — L98.9: ICD-10-CM

## 2018-05-30 DIAGNOSIS — Z79.899: ICD-10-CM

## 2018-05-30 DIAGNOSIS — N40.0: ICD-10-CM

## 2018-05-30 DIAGNOSIS — E78.5: ICD-10-CM

## 2018-05-30 DIAGNOSIS — Z83.3: ICD-10-CM

## 2018-05-30 DIAGNOSIS — Z79.82: ICD-10-CM

## 2018-05-30 DIAGNOSIS — Z88.5: ICD-10-CM

## 2018-05-30 DIAGNOSIS — Z86.73: ICD-10-CM

## 2018-05-30 DIAGNOSIS — T40.4X5A: ICD-10-CM

## 2018-05-30 DIAGNOSIS — M48.07: ICD-10-CM

## 2018-05-30 DIAGNOSIS — M54.42: ICD-10-CM

## 2018-05-30 DIAGNOSIS — Z87.820: ICD-10-CM

## 2018-05-30 DIAGNOSIS — R26.9: ICD-10-CM

## 2018-05-30 DIAGNOSIS — Z87.11: ICD-10-CM

## 2018-05-30 DIAGNOSIS — Z90.49: ICD-10-CM

## 2018-05-30 DIAGNOSIS — F32.9: ICD-10-CM

## 2018-05-30 DIAGNOSIS — G89.29: ICD-10-CM

## 2018-05-30 DIAGNOSIS — M54.41: ICD-10-CM

## 2018-05-30 DIAGNOSIS — F40.240: ICD-10-CM

## 2018-05-30 DIAGNOSIS — M51.37: ICD-10-CM

## 2018-05-30 PROCEDURE — 94760 N-INVAS EAR/PLS OXIMETRY 1: CPT

## 2018-05-30 PROCEDURE — 81001 URINALYSIS AUTO W/SCOPE: CPT

## 2018-05-30 PROCEDURE — 80048 BASIC METABOLIC PNL TOTAL CA: CPT

## 2018-05-30 PROCEDURE — 72148 MRI LUMBAR SPINE W/O DYE: CPT

## 2018-05-30 PROCEDURE — 80053 COMPREHEN METABOLIC PANEL: CPT

## 2018-05-30 PROCEDURE — 96374 THER/PROPH/DIAG INJ IV PUSH: CPT

## 2018-05-30 PROCEDURE — 95819 EEG AWAKE AND ASLEEP: CPT

## 2018-05-30 PROCEDURE — 74018 RADEX ABDOMEN 1 VIEW: CPT

## 2018-05-30 PROCEDURE — 70450 CT HEAD/BRAIN W/O DYE: CPT

## 2018-05-30 PROCEDURE — 82553 CREATINE MB FRACTION: CPT

## 2018-05-30 PROCEDURE — 85025 COMPLETE CBC W/AUTO DIFF WBC: CPT

## 2018-05-30 PROCEDURE — 85610 PROTHROMBIN TIME: CPT

## 2018-05-30 PROCEDURE — 80306 DRUG TEST PRSMV INSTRMNT: CPT

## 2018-05-30 PROCEDURE — 71046 X-RAY EXAM CHEST 2 VIEWS: CPT

## 2018-05-30 PROCEDURE — 70551 MRI BRAIN STEM W/O DYE: CPT

## 2018-05-30 PROCEDURE — 85730 THROMBOPLASTIN TIME PARTIAL: CPT

## 2018-05-30 PROCEDURE — 84484 ASSAY OF TROPONIN QUANT: CPT

## 2018-05-30 PROCEDURE — 36415 COLL VENOUS BLD VENIPUNCTURE: CPT

## 2018-05-30 PROCEDURE — 82550 ASSAY OF CK (CPK): CPT

## 2018-05-30 PROCEDURE — 99285 EMERGENCY DEPT VISIT HI MDM: CPT

## 2018-05-30 PROCEDURE — 93005 ELECTROCARDIOGRAM TRACING: CPT

## 2018-05-31 LAB
ALBUMIN SERPL-MCNC: 4.5 G/DL (ref 3.5–5)
ALP SERPL-CCNC: 31 U/L (ref 38–126)
ALT SERPL-CCNC: 28 U/L (ref 21–72)
ANION GAP SERPL CALC-SCNC: 15 MMOL/L
APTT BLD: 21.8 SEC (ref 22–30)
AST SERPL-CCNC: 32 U/L (ref 17–59)
BASOPHILS # BLD AUTO: 0 K/UL (ref 0–0.2)
BASOPHILS NFR BLD AUTO: 0 %
BUN SERPL-SCNC: 42 MG/DL (ref 9–20)
CALCIUM SPEC-MCNC: 9.8 MG/DL (ref 8.4–10.2)
CHLORIDE SERPL-SCNC: 95 MMOL/L (ref 98–107)
CK SERPL-CCNC: 74 U/L (ref 55–170)
CO2 SERPL-SCNC: 30 MMOL/L (ref 22–30)
EOSINOPHIL # BLD AUTO: 0.1 K/UL (ref 0–0.7)
EOSINOPHIL NFR BLD AUTO: 1 %
ERYTHROCYTE [DISTWIDTH] IN BLOOD BY AUTOMATED COUNT: 4.68 M/UL (ref 4.3–5.9)
ERYTHROCYTE [DISTWIDTH] IN BLOOD: 12.8 % (ref 11.5–15.5)
GLUCOSE BLD-MCNC: 133 MG/DL (ref 75–99)
GLUCOSE SERPL-MCNC: 133 MG/DL (ref 74–99)
HCT VFR BLD AUTO: 41.1 % (ref 39–53)
HGB BLD-MCNC: 13.8 GM/DL (ref 13–17.5)
INR PPP: 1.2 (ref ?–1.2)
LYMPHOCYTES # SPEC AUTO: 1 K/UL (ref 1–4.8)
LYMPHOCYTES NFR SPEC AUTO: 7 %
MCH RBC QN AUTO: 29.5 PG (ref 25–35)
MCHC RBC AUTO-ENTMCNC: 33.6 G/DL (ref 31–37)
MCV RBC AUTO: 87.8 FL (ref 80–100)
MONOCYTES # BLD AUTO: 0.7 K/UL (ref 0–1)
MONOCYTES NFR BLD AUTO: 5 %
NEUTROPHILS # BLD AUTO: 12.4 K/UL (ref 1.3–7.7)
NEUTROPHILS NFR BLD AUTO: 86 %
PLATELET # BLD AUTO: 236 K/UL (ref 150–450)
POTASSIUM SERPL-SCNC: 4.2 MMOL/L (ref 3.5–5.1)
PROT SERPL-MCNC: 7.7 G/DL (ref 6.3–8.2)
PT BLD: 11.2 SEC (ref 9–12)
SODIUM SERPL-SCNC: 140 MMOL/L (ref 137–145)
TROPONIN I SERPL-MCNC: <0.012 NG/ML (ref 0–0.03)
WBC # BLD AUTO: 14.3 K/UL (ref 3.8–10.6)

## 2018-05-31 RX ADMIN — OXYBUTYNIN CHLORIDE SCH MG: 15 TABLET, EXTENDED RELEASE ORAL at 21:16

## 2018-05-31 RX ADMIN — CARVEDILOL SCH MG: 6.25 TABLET, FILM COATED ORAL at 08:22

## 2018-05-31 RX ADMIN — BACLOFEN SCH MG: 10 TABLET ORAL at 21:17

## 2018-05-31 RX ADMIN — ASPIRIN 81 MG CHEWABLE TABLET SCH MG: 81 TABLET CHEWABLE at 08:19

## 2018-05-31 RX ADMIN — FAMOTIDINE SCH MG: 20 TABLET, FILM COATED ORAL at 21:16

## 2018-05-31 RX ADMIN — BACLOFEN SCH MG: 10 TABLET ORAL at 15:28

## 2018-05-31 RX ADMIN — POTASSIUM CHLORIDE SCH MLS/HR: 14.9 INJECTION, SOLUTION INTRAVENOUS at 15:09

## 2018-05-31 RX ADMIN — TAMSULOSIN HYDROCHLORIDE SCH MG: 0.4 CAPSULE ORAL at 21:17

## 2018-05-31 RX ADMIN — CARVEDILOL SCH MG: 6.25 TABLET, FILM COATED ORAL at 15:06

## 2018-05-31 NOTE — HP
HISTORY AND PHYSICAL



DATE OF ADMISSION:

18



DATE OF SERVICE:

18



PRESENTING COMPLAINT:

Lethargic.



HISTORY OF PRESENTING COMPLAINT:

This is a 67-year-old patient of Dr. Bravo whose chronic stable medical conditions

include COPD, GERD, hyperlipidemia, osteoarthritis, prostate disorder, restless legs

syndrome, chronic low back pain with bilateral sciatica, hiatal hernia.  The patient

has been having lower extremity swelling for which his dose of Neurontin was cut back.

The patient was brought in the hospital being lethargic and somewhat delirious as per

the history obtained by the nurse.  The patient is lethargic right now, does wake up.

He knows he is in the hospital, but then dozes off easily.  The patient denies any

weakness or any headache.  No fevers reported.  In the ER, patient's BUN and creatinine

is noted to be 42 and 1.8.  There was no fever reported.  The patient is difficult to

get any more history as he is rather lethargic.



REVIEW OF SYSTEMS:

Cannot be done as patient is lethargic.



PAST MEDICAL HISTORY:

COPD, stroke, GERD, hyperlipidemia, osteoarthritis, prostate disorder, skin disorder,

bilateral leg swelling, per patient was due to Neurontin, restless leg syndrome, low

back pain, bilateral sciatica, hiatal hernia, stomach ulcers, TIA 8 years ago, vertigo,

concussion from pro football injury.



PAST SURGICAL HISTORY:

Appendectomy, cholecystectomy, cardiac catheterization in 2018.  Cardiac

cath was normal.  Cyst removed from the right hand, colonoscopy, polypectomy.



PSYCH HISTORY:

Anxiety and depression.



SOCIAL HISTORY:

Lives by himself.  The patient's spouse  last year.  The patient smoked a pack and

a half for close to 55 years.  Stopped in .  Lives by himself.  Alcohol

occasionally.



FAMILY HISTORY:

Diabetes.



HOME MEDICATIONS:

1. Flomax 0.8 mg q.h.s.

2. Potassium 20 mEq a day.

3. Hydrochlorothiazide 50 mg p.o. daily.

4. Tricor 134 mg q.h.s.

5. Melatonin 3 mg p.o. q.h.s. p.r.n.

6. Lisinopril 40 mg p.o. daily.

7. Ultram 50 mg p.o. t.i.d.

8. Oxybutynin ER 50 mg q.h.s.

9. Zyrtec 10 mg p.o. daily.

10.Aspirin 81 mg p.o. daily.

11.Norvasc 10 mg p.o. daily.

12.Zantac 300 mg q.h.s.

13.Paxil 20 mg p.o. daily.

14.Naproxen 500 mg p.o. b.i.d.

15.Flonase 1 spray each nostril daily.

16.Coreg 6.25 p.o. b.i.d.

17.Baclofen 5 mg p.o. t.i.d.

18.Ativan 0.5 mg q.6h p.r.n.

19.Desyrel 100 mg q.h.s.



ALLERGIES:

TO LIPITOR AND CODEINE.



PHYSICAL EXAMINATION:

Vital signs on presentation, temperature 97.5, pulse 63, respiratory 20, blood pressure

116/59, pulse ox 94% on room air.

GENERAL APPEARANCE: Well built, BMI 43.1.  Lying in bed, lethargic but arousable.

EYES:  Pupils are equal. Conjunctivae normal.

HEENT: External appearance of nose and ears normal. Oral cavity normal.

NECK:  JVD unable to assess.  Mass not palpable.

RESPIRATORY:  Slightly decreased breath sounds.

CARDIOVASCULAR:  1st and second sounds normal.  No edema.

ABDOMEN:  Distended, soft.  Liver and spleen not palpable. LYMPHATICS: No lymph nodes

palpable in the neck and axilla.

PSYCHIATRY:  Cannot really assess, but patient knows that he is in the hospital.

NEUROLOGICAL:  Pupils equal.  No facial asymmetry.  Moving all 4 limbs.



INVESTIGATIONS:

White count 14.3, hemoglobin 13.8, potassium 4.2, BUN 42, creatinine 1.80.  The

patient's blood work from February of this year shows a BUN of 22, creatinine 0.97.  CT

scan of the brain nil acute.



ASSESSMENT:

1. This is a patient who presents with being very lethargic.  It may be noted that the

    patient has acute renal failure and patient is on Neurontin and this will

    definitely make the patient lethargic.  At the same time, patient has also been on

    Ultram and Zyrtec and Ativan p.r.n. and Desyrel and this does all add up to the

    patient being rather drowsy.

2. Acute renal failure, exact cause unknown, but the patient is on ACE inhibitor,

    hydrochlorothiazide will be discontinued for now.

3. Morbid obesity BMI 43.1.

4. Chronic obstructive pulmonary disease.

5. Gastroesophageal reflux disease.

6. Hyperlipidemia.

7. Primary osteoarthritis.

8. Prostate disorder.

9. Restless leg syndrome.

10.Hiatal hernia hiatal hernia.



PLAN:

At this point, patient will be kept on IV fluids to improve his renal function.  We

will cut back the dose of Neurontin 200 mg 3 times a day in the setting of renal

failure.  The patient's  hydrochlorothiazide will be discontinued and so will be

patient's ACE inhibitor and naproxen, will all be discontinued.  The patient will be

given IV fluids.  Will do neuro checks with aspiration precautions.  Expect the patient

to turn around.  Keep on the neuro checks.  Copy to Dr. Bravo.





TERESA / EMILY: [id number] / Job#: [id number]

## 2018-05-31 NOTE — CT
PROCEDURE: CT HEAD  Without Contrast

 

HISTORY: 67-year-old male with altered mental status.

 

COMPARISON: None

 

TECHNIQUE: CT imaging was obtained through the head. Coronal and sagittal 

reformations were performed.

 

DOSE: Total Exam volume computed tomography dose index (CTDIvol) =  57.4 

mGy and Dose Length Product (DLP) = 1047.1 mGY-cm. This CT exam was 

performed using one or more of the following dose reduction techniques: 

automated exposure control, adjustment of the mA and/or kV according to 

patient size, and/or use of iterative reconstruction technique.

 

FINDINGS: 

 

There is no evidence of acute intracranial hemorrhage, mass effect, or 

midline shift. The ventricles, sulci, and cisternal spaces are within 

normal limits for age. The gray-white matter differentiation is preserved.

 Bilateral white matter hypodensities, nonspecific but likely due to 

chronic microvascular ischemic changes.

 

Intracranial arterial calcifications.

 

The bony structures are intact. Non--aggressive appearing lucent bone 

lesion in the anterior wall of the right frontal sinus. Visualized 

paranasal sinuses and mastoid air cells are clear. Visualized portions of 

the orbits are within normal limits. 

 

IMPRESSION: 

 

1. No CT evidence of acute intracranial abnormality.

 

2. Other findings as detailed above.

## 2018-05-31 NOTE — P.CNNES
History of Present Illness


Consult date: 18


Reason for Consult: This patient admitted with altered mental status.


History of Present Illness: 





This patient is a 67-year-old right-handed white male who apparently over the 

last week has been showing increasing symptoms of confusion and disorientation 

at home.  Apparently he lives in his own home in the has several of his 

children check on him on a regular basis.  Recently the daughters have found 

him to be very confused and disoriented.  He was screaming for help the other 

day and apparently was unclear why he was in need of immediate help.  It was 

felt he may have had a side effect to one of his medications producing some of 

the confusion and it was discontinued.  Apparently he has been using Neurontin 

which caused some side effects with bilateral leg swelling.  This was 

discontinued but he continues to have evidence of increase anxiety and 

confusion.  His symptoms have been progressing over the last few weeks.  Family 

finally convinced him to come to the emergency room for further evaluation.  He 

was seen in the emergency room at [facility] yesterday by 

physician Dr. Lemon.  A computed tomography scan of the brain was ordered which 

revealed no acute intracranial abnormality.  Patient continued to have 

confusion and for this reason he was admitted to hospital for further 

evaluation.  Patient appears at bedside to be very withdrawn and depressed.  

Apparently he lost his wife in November and has still symptoms of grief and 

episodes of depression.  He has not been walking apparently for the last few 

days which is also unusual for him as he normally walks and gets about on his 

own.  Apparently he was brought in in a wheelchair but had been walking 

previously.  He does complain of some low back pain symptoms recently and he 

will be re-examined for this as well.  The patient is very withdrawn and makes 

very poor eye contact.  He does appear to be depressed at this time.  We have 

recommended a psychiatry consultation for the patient.  He does have a slightly 

elevated white count which is being closely monitored.  The patient is not a 

very good historian.  He states he does get angry and upset easily and cannot 

explain why he becomes withdrawn.  As noted computed tomography scan of the 

brain failed to reveal any acute changes.  We have recommended further testing 

with MRI of the brain and MRI of the lumbar spine for further evaluation.  He 

did undergo routine EEG today which is reviewed and was within normal limits 

for his age.  The patient at this time appears to be withdrawn and mildly 

confused.  This may be a form of pseudodementia.  We will await further 

evaluation by psychiatry.  The patient states he is not sure why his legs are 

weak and has not been up or walking since admission to the hospital.  We have 

recommended physical therapy to see the patient as well as Dr. Medina for 

possible inpatient rehab.  His overall prognosis at this time remains guarded.  

Neurology is now been consulted for further evaluation and recommendations.





Review of Systems


Constitutional: Denies chills, Denies fever


Eyes: denies blurred vision, denies pain


Ears, nose, mouth and throat: Denies headache, Denies sore throat


Cardiovascular: Denies chest pain, Denies shortness of breath


Respiratory: Denies cough


Gastrointestinal: Denies abdominal pain, Denies diarrhea, Denies nausea, Denies 

vomiting


Musculoskeletal: Denies myalgias


Integumentary: Denies pruritus, Denies rash


Neurological: Reports change in mentation, Reports confusion, Reports gait 

dysfunction (This letter), Reports memory loss, Reports tingling, Denies 

numbness, Denies weakness


Psychiatric: Reports disorientation, Reports hopelessness, Reports mood swings, 

Denies anxiety, Denies depression


Endocrine: Denies fatigue, Denies weight change





Past Medical History


Past Medical History: Asthma, COPD, CVA/TIA, GERD/Reflux, Hyperlipidemia, 

Osteoarthritis (OA), Prostate Disorder, Skin Disorder


Additional Past Medical History / Comment(s): Recent bilateral leg swelling 

which pt states is d/t neurontin, restless Leg Syndrome, low back pain and 

bilateral sciatica, hiatal hernia, stomach ulcer, TIA 8 years ago, vertigo, 

past  concussion from football injury.


History of Any Multi-Drug Resistant Organisms: None Reported


Past Surgical History: Appendectomy, Cholecystectomy, Heart Catheterization, 

Tonsillectomy


Additional Past Surgical History / Comment(s): Cardiac cath 18 normal, cyst 

removed from right hand, colonoscopy/polypectomy


Past Anesthesia/Blood Transfusion Reactions: Motion Sickness


Additional Past Anesthesia/Blood Transfusion Reaction / Comment(s): 

claustrophobia


Smoking Status: Former smoker





- Past Family History


  ** Mother


Family Medical History: Diabetes Mellitus


Additional Family Medical History / Comment(s): Mother  at the age of 86yrs.





  ** Father


Family Medical History: Cancer


Additional Family Medical History / Comment(s): Throat cancer and  at the 

age of 50yrs.





Medications and Allergies


 Home Medications











 Medication  Instructions  Recorded  Confirmed  Type


 


PARoxetine [Paxil] 20 mg PO QAM 03/02/15 05/31/18 History


 


amLODIPine [Norvasc] 10 mg PO DAILY 03/02/15 05/31/18 History


 


Fenofibrate,Micronized 134 mg PO HS 03/03/15 05/31/18 History





[Fenofibrate]    


 


Cetirizine HCl [Zyrtec] 10 mg PO DAILY 17 History


 


Oxybutynin Chloride [Oxybutynin 15 mg PO HS 17 History





Chloride ER]    


 


Ranitidine HCl [Zantac] 300 mg PO HS 17 History


 


Tamsulosin HCl [Flomax] 0.8 mg PO HS 17 History


 


Carvedilol [Coreg] 6.25 mg PO BID #60 tablet 17 Rx


 


Aspirin [Adult Low Dose Aspirin EC] 81 mg PO QAM 18 History


 


Melatonin 3 mg PO HS PRN 18 History


 


Naproxen 500 mg PO BID 18 History


 


traMADol HCl [Ultram] 50 mg PO TID 18 History


 


Baclofen 5 mg PO TID 18 History


 


Fluticasone Nasal Spray [Flonase 1 spray EA NOSTRIL DAILY 18 

History





Nasal Spray]    


 


Hydrochlorothiazide [Hydrodiuril] 50 mg PO DAILY 18 History


 


LORazepam [Ativan] 0.5 mg PO Q6H PRN 18 History


 


Lisinopril 40 mg PO DAILY 18 History


 


Potassium Chloride [Klor-Con 20] 20 meq PO DAILY 18 History


 


traZODone HCL [Desyrel] 100 mg PO HS 18 History











 Allergies











Allergy/AdvReac Type Severity Reaction Status Date / Time


 


atorvastatin [From Lipitor] Allergy  Unknown Verified 18 08:05


 


codeine Allergy  Unknown Verified 18 08:05














Physical Examination





- Vital Signs


Vital Signs: 


 Vital Signs











  Temp Pulse Pulse Resp BP BP Pulse Ox


 


 18 14:42  97.5 F L   62  14   148/68  98


 


 18 08:23    65    


 


 18 08:00    65  16   


 


 18 06:01  98.0 F  58 L   16  125/67   97


 


 18 23:52  97.5 F L  63   20  116/59   94 L








 Intake and Output











 18





 06:59 14:59 22:59


 


Intake Total  1080 


 


Balance  1080 


 


Intake:   


 


  Intake, IV Titration  600 





  Amount   


 


    Sodium Chloride 0.9% 1,  600 





    000 ml @ 75 mls/hr IV .   





    [id number] Rx#:[id number]   


 


  Oral  480 


 


Other:   


 


  Voiding Method  Diaper 


 


  # Voids  1 


 


  Weight 117.48 kg  














- Constitutional


General appearance: average body habitus, cooperative





- EENT


EENT: PERRL, mucous membranes moist





- Respiratory


Respiratory: lungs clear, normal breath sounds





- Cardiovascular


Cardiovascular: regular rate, normal S1, normal S2


Extremities: no peripheral edema bilaterally





- Gastrointestinal


Gastrointestinal: normoactive bowel sounds





- Neurologic


Cranial nerve examination: PERRL, EOMI, VFF, V1/V2/V3 grossly intact, face 

symmetric, intact gag reflex, intact corneal reflex, normal palatal elevation


Speech examination: intact


Sensorimotor examination: intact


Motor examination - right side: 3/5: hip flexors, knee extensors, dorsiflexion, 

toe extension (EHL), plantarflexion, 4/5: biceps, triceps, wrist flexion, wrist 

extension, 


Motor examination - left side: 3/5: hip flexors, knee extensors, dorsiflexion, 

toe extension (EHL), plantarflexion, 4/5: biceps, triceps, wrist flexion, wrist 

extension, 


Detailed sensory examination: intact


Reflex and gait examination: intact


Reflexes: 1+: ankle, bicep, knee, tricep





- Musculoskeletal


Musculoskeletal: no pain





- Psychiatric


Psychiatric: mood/affect appropriate, cooperative





Results





- Laboratory Findings


CBC and BMP: 


 18 00:23





 18 00:23


Abnormal Lab Findings: 


 Abnormal Labs











  18





  00:23 00:23 00:23


 


WBC  14.3 H  


 


Neutrophils #  12.4 H  


 


INR    1.2 H


 


APTT    21.8 L


 


Chloride   95 L 


 


BUN   42 H 


 


Creatinine   1.80 H 


 


Glucose   133 H 


 


POC Glucose (mg/dL)   


 


Alkaline Phosphatase   31 L 














  18





  00:28


 


WBC 


 


Neutrophils # 


 


INR 


 


APTT 


 


Chloride 


 


BUN 


 


Creatinine 


 


Glucose 


 


POC Glucose (mg/dL)  133 H


 


Alkaline Phosphatase 














Assessment and Plan


(1) Acute encephalopathy


Current Visit: Yes   Status: Acute   Code(s): G93.40 - ENCEPHALOPATHY, 

UNSPECIFIED   SNOMED Code(s): 77115821


   





(2) Depression


Current Visit: Yes   Status: Acute   Code(s): F32.9 - MAJOR DEPRESSIVE DISORDER

, SINGLE EPISODE, UNSPECIFIED   SNOMED Code(s): 60280370


   





(3) Bilateral leg weakness


Current Visit: Yes   Status: Acute   Code(s): R29.898 - OTH SYMPTOMS AND SIGNS 

INVOLVING THE MUSCULOSKELETAL SYSTEM   SNOMED Code(s): 6296606


   





(4) Hyperlipidemia


Current Visit: No   Status: Acute   Code(s): E78.5 - HYPERLIPIDEMIA, 

UNSPECIFIED   SNOMED Code(s): 64105409


   


Plan: 





This patient is a 67-year-old male admitted with acute change in mentation in 

mental status at home over the last week.  He has become more confused and 

disoriented as per family's report.  Patient was brought into the emergency 

room yesterday and underwent a computed tomography scan of the brain results of 

which are noted above.  We are recommending the patient undergo an MRI of the 

brain and lumbar spine for further evaluation.  He is also presenting with leg 

weakness of unknown etiology.  He did undergo routine EEG which was reviewed 

today and is normal for his age.  He does appear to be depressed on questioning 

today and we are recommending a psychiatry consultation for further evaluation 

of acute depression.  He has been depressed after losing his wife in November 

of last year.  We have recommended a physical therapy consultation as well as 

evaluation by Dr. Medina for possible subacute rehab.  His overall prognosis at 

this time remains guarded.  We will await the results of his neuroimaging 

studies and we will give further recommendations.  We will follow this patient 

closely with you.


Time with Patient: Greater than 30

## 2018-05-31 NOTE — XR
PROCEDURE: FILM CXR 2 VIEWS

 

HISTORY: 67-year-old male with weakness and pain.

 

COMPARISON: Chest radiograph 3/2/2015

 

TECHNIQUE: Frontal and lateral views of the chest were obtained.

 

FINDINGS: 

 

Limited by body habitus and technique.

 

Cardiomediastinal silhouette is stable.

 

No evidence of focal consolidation, pleural effusion, or pneumothorax

 

Degenerative changes in the spine.

 

IMPRESSION:

 

Clear lungs.

## 2018-05-31 NOTE — ED
General Adult HPI





- General


Chief complaint: Recheck/Abnormal Lab/Rx


Stated complaint: Withdrawals


Time Seen by Provider: 18 00:04


Source: family


Mode of arrival: wheelchair


Limitations: no limitations





- History of Present Illness


Initial comments: 


Years old gentleman presents with a confusion family said he has been on them 

larger dose of Neurontin that caused him some swelling of the legs that dose 

was decreased and then finally it was discontinued he has been more confused 

for the last few weeks he repeats over and over and over asking for help family 

said he also has a history of anxiety and they think his anxiety is getting 

worse.  When I asked him myself he said he just don't feel right he denies any 

headaches no blurred vision no stiff neck no chest pain or shortness of breath 

no abdominal pain








- Related Data


 Home Medications











 Medication  Instructions  Recorded  Confirmed


 


PARoxetine [Paxil] 20 mg PO BID 03/02/15 02/01/18


 


amLODIPine [Norvasc] 10 mg PO 1400 03/02/15 02/01/18


 


Fenofibrate,Micronized 134 mg PO HS 03/03/15 02/01/18





[Fenofibrate]   


 


Cetirizine HCl [Zyrtec] 10 mg PO DAILY 17


 


Oxybutynin Chloride [Oxybutynin 30 mg PO HS 17





Chloride ER]   


 


Ranitidine HCl [Zantac] 300 mg PO HS 17


 


Tamsulosin HCl [Flomax] 0.8 mg PO HS 17


 


Aspirin [Adult Low Dose Aspirin EC] 81 mg PO QAM 18


 


Hydrochlorothiazide 25 mg PO DAILY 18


 


Melatonin 3 mg PO HS PRN 18


 


Naproxen 500 mg PO BID 18


 


traMADol HCl [Ultram] 50 mg PO TID 18


 


Baclofen 10 mg PO TID 18


 


Gabapentin [Neurontin] 300 mg PO TID 18








 Previous Rx's











 Medication  Instructions  Recorded


 


Carvedilol [Coreg] 6.25 mg PO BID #60 tablet 17


 


Lisinopril [Zestril] 20 mg PO BID  tab 17











 Allergies











Allergy/AdvReac Type Severity Reaction Status Date / Time


 


atorvastatin [From Lipitor] Allergy  Unknown Verified 18 09:11


 


codeine Allergy  Unknown Verified 18 09:11














Review of Systems


ROS Statement: 


Those systems with pertinent positive or pertinent negative responses have been 

documented in the HPI.





ROS Other: All systems not noted in ROS Statement are negative.





Past Medical History


Past Medical History: Asthma, COPD, CVA/TIA, GERD/Reflux, Hyperlipidemia, 

Osteoarthritis (OA), Skin Disorder


Additional Past Medical History / Comment(s): Restless Leg Syndrome, sciatica, 

hiatal hernia, TIA 8 years ago, ulcers,see Dr Denson H&P, varicose veins, 

dry skin on wrist,


History of Any Multi-Drug Resistant Organisms: None Reported


Past Surgical History: Appendectomy, Cholecystectomy, Heart Catheterization, 

Tonsillectomy


Additional Past Surgical History / Comment(s): Cyst Removed from Right Hand,


Past Anesthesia/Blood Transfusion Reactions: Motion Sickness


Additional Past Anesthesia/Blood Transfusion Reaction / Comment(s): 

claustrophobia


Past Psychological History: Anxiety


Smoking Status: Former smoker


Past Alcohol Use History: Occasional


Past Drug Use History: None Reported





- Past Family History


  ** Mother


Family Medical History: Diabetes Mellitus


Additional Family Medical History / Comment(s):  at age 86





  ** Father


Family Medical History: Cancer


Additional Family Medical History / Comment(s): throat cancer





General Exam





- General Exam Comments


Initial Comments: 


General:  The patient is barely awake, he does answer the questions, they are 

appropriate.  He seems very tired, lethargic, GCS is 15


Skin:  Skin is warm and dry and no rashes or lesions are noted. 


Eye:  Pupils are equal, round and reactive to light, extra-ocular movements are 

intact; there is normal conjunctiva bilaterally.  


Ears, nose, mouth and throat:  There are moist mucous membranes and no oral 

lesions. 


Neck:  The neck is supple, there is no tenderness    


Cardiovascular:  There is a regular rate and rhythm. No murmur, rub or gallop 

is appreciated.  Distant S1 and S2


Respiratory: To auscultation bilateral, decrease breath sounds bilaterally 

patient has a poor inspiratory effort


Gastrointestinal:  Soft, non-distended, non-tender abdomen without masses or 

organomegaly noted. There is no rebound or guarding present. Bowel sounds are 

unremarkable. 


Back:  There is no tenderness to palpation in the midline. There is no obvious 

deformity.


Musculoskeletal:  Normal ROM, no tenderness, There is no pedal edema. There is 

no calf tenderness or swelling. No cords were appreciated.  


Neurological:  CN II-XII intact, Cranial nerves III through XII are intact. 

There are no obvious motor or sensory deficits. Coordination appears grossly 

intact. Speech is normal.


Psychiatric:  Cooperative, seems depressed, tired to evaluate him from 

psychiatry standpoint barely talk, he hardly talks 








Limitations: no limitations





Course


 Vital Signs











  18





  23:52


 


Temperature 97.5 F L


 


Pulse Rate 63


 


Respiratory 20





Rate 


 


Blood Pressure 116/59


 


O2 Sat by Pulse 94 L





Oximetry 








, Patient's white count is 14.3, creatinine is 1.8 chest x-ray is normal KUB is 

normal head CT is normal he still quite confused and admitted to Dr. Tamayo's 

service and will consult neurology





EKG Findings





- EKG Comments:


EKG Findings:: EKG is sinus bradycardia with a ventricular rate of 51 NV 

interval is 152 QRS duration is 88 QT/QTC is 490/451 review of this EKG does 

not reveal any ST elevation or ST depression





Medical Decision Making





- Lab Data


Result diagrams: 


 18 00:23





 Lab Results











  18 Range/Units





  00:23 00:23 00:28 


 


WBC  14.3 H    (3.8-10.6)  k/uL


 


RBC  4.68    (4.30-5.90)  m/uL


 


Hgb  13.8    (13.0-17.5)  gm/dL


 


Hct  41.1    (39.0-53.0)  %


 


MCV  87.8    (80.0-100.0)  fL


 


MCH  29.5    (25.0-35.0)  pg


 


MCHC  33.6    (31.0-37.0)  g/dL


 


RDW  12.8    (11.5-15.5)  %


 


Plt Count  236    (150-450)  k/uL


 


Neutrophils %  86    %


 


Lymphocytes %  7    %


 


Monocytes %  5    %


 


Eosinophils %  1    %


 


Basophils %  0    %


 


Neutrophils #  12.4 H    (1.3-7.7)  k/uL


 


Lymphocytes #  1.0    (1.0-4.8)  k/uL


 


Monocytes #  0.7    (0-1.0)  k/uL


 


Eosinophils #  0.1    (0-0.7)  k/uL


 


Basophils #  0.0    (0-0.2)  k/uL


 


PT   11.2   (9.0-12.0)  sec


 


INR   1.2 H   (<1.2)  


 


APTT   21.8 L   (22.0-30.0)  sec


 


POC Glucose (mg/dL)    133 H  (75-99)  mg/dL


 


POC Glu Operater ID    Jade Dickson  














Disposition


Clinical Impression: 


 Change in mental status





Disposition: ADMITTED AS IP TO THIS HOSP


Condition: Good


Referrals: 


Michael Bravo MD [Primary Care Provider] - 1-2 days

## 2018-05-31 NOTE — EEG
ELECTROENCEPHALOGRAM REPORT



DATE OF EE2018.



REFERRING PHYSICIAN:

Dr. Van.



CONSULTING/INTERPRETING PHYSICIAN:

Dr. Pamela Abebe MD



ELECTROENCEPHALOGRAPHIC EXAMINATION REPORT:



INDICATION FOR EXAMINATION:

This patient is a 67-year-old male being evaluated for altered mental status and

confusion.  Patient also with increased anxiety disorder.



AGE[age]

[age].



EEG FINDINGS:

A routine 21 channel awake digital EEG recording was accomplished utilizing the 10-20

international system with bipolar and referential montages.  The background activity in

the most alert resting state consists of a low to medium amplitude, fairly well-

developed and well sustained 7 Hz activity over the posterior head regions.  This

posterior rhythm attenuates to eye opening.  There is a small amount of low amplitude

18-20 Hz beta activity seen maximally over the anterior head regions.  Muscle and

movement artifact was observed on a few occasions during the tracing.



Hyperventilation was not performed.  Photic stimulation at flash frequencies of 2-30 Hz

produced a minimal occipital driving response.  No epileptiform discharges were seen.



IMPRESSION:

This EEG is within normal limits for the patient's age.  The EEG failed to reveal any

focal, lateralized, or epileptiform abnormalities.  Clinical correlation is

recommended.





MMODL / IJN: [id number] / Job#: [id number]

## 2018-05-31 NOTE — XR
PROCEDURE: FILM ABDOMEN

 

HISTORY: 67-year-old male with weakness, pain, and vomiting

 

COMPARISON: None

 

TECHNIQUE: Frontal supine view of the abdomen was obtained.

 

FINDINGS: 

 

Limited by body habitus and positioning.

 

Nonspecific bowel gas pattern.

 

5 millimeter rounded calcification overlying the right hemipelvis may 

only represents a phlebolith. Ureteral calculus is in the differential.

 

Degenerative changes of the spine.

 

IMPRESSION:

 

Nonspecific bowel gas pattern.

 

5 millimeter rounded calcification overlying the right hemipelvis may 

only represents a phlebolith. Ureteral calculus is in the differential.

## 2018-06-01 VITALS — RESPIRATION RATE: 16 BRPM

## 2018-06-01 LAB
ANION GAP SERPL CALC-SCNC: 13 MMOL/L
BUN SERPL-SCNC: 25 MG/DL (ref 9–20)
CALCIUM SPEC-MCNC: 9.5 MG/DL (ref 8.4–10.2)
CHLORIDE SERPL-SCNC: 96 MMOL/L (ref 98–107)
CO2 SERPL-SCNC: 30 MMOL/L (ref 22–30)
GLUCOSE SERPL-MCNC: 106 MG/DL (ref 74–99)
PH UR: 7.5 [PH] (ref 5–8)
POTASSIUM SERPL-SCNC: 3.9 MMOL/L (ref 3.5–5.1)
PSA SERPL-MCNC: 0.81 NG/ML (ref 0–4)
RBC UR QL: 21 /HPF (ref 0–5)
SODIUM SERPL-SCNC: 139 MMOL/L (ref 137–145)
SP GR UR: 1.01 (ref 1–1.03)
UROBILINOGEN UR QL STRIP: 2 MG/DL (ref ?–2)
WBC #/AREA URNS HPF: >182 /HPF (ref 0–5)

## 2018-06-01 RX ADMIN — GABAPENTIN SCH MG: 100 CAPSULE ORAL at 17:00

## 2018-06-01 RX ADMIN — GABAPENTIN SCH MG: 100 CAPSULE ORAL at 20:43

## 2018-06-01 RX ADMIN — POTASSIUM CHLORIDE SCH MLS/HR: 14.9 INJECTION, SOLUTION INTRAVENOUS at 00:44

## 2018-06-01 RX ADMIN — CARVEDILOL SCH MG: 6.25 TABLET, FILM COATED ORAL at 09:21

## 2018-06-01 RX ADMIN — TAMSULOSIN HYDROCHLORIDE SCH MG: 0.4 CAPSULE ORAL at 20:43

## 2018-06-01 RX ADMIN — FAMOTIDINE SCH MG: 20 TABLET, FILM COATED ORAL at 20:42

## 2018-06-01 RX ADMIN — ASPIRIN 81 MG CHEWABLE TABLET SCH MG: 81 TABLET CHEWABLE at 09:23

## 2018-06-01 RX ADMIN — POTASSIUM CHLORIDE SCH: 14.9 INJECTION, SOLUTION INTRAVENOUS at 15:15

## 2018-06-01 RX ADMIN — POTASSIUM CHLORIDE SCH MLS/HR: 14.9 INJECTION, SOLUTION INTRAVENOUS at 09:21

## 2018-06-01 RX ADMIN — GABAPENTIN SCH MG: 100 CAPSULE ORAL at 09:27

## 2018-06-01 RX ADMIN — OXYBUTYNIN CHLORIDE SCH MG: 15 TABLET, EXTENDED RELEASE ORAL at 20:44

## 2018-06-01 RX ADMIN — BACLOFEN SCH MG: 10 TABLET ORAL at 16:59

## 2018-06-01 RX ADMIN — BACLOFEN SCH MG: 10 TABLET ORAL at 09:23

## 2018-06-01 RX ADMIN — GABAPENTIN SCH: 100 CAPSULE ORAL at 09:31

## 2018-06-01 RX ADMIN — BACLOFEN SCH MG: 10 TABLET ORAL at 20:43

## 2018-06-01 RX ADMIN — CARVEDILOL SCH MG: 6.25 TABLET, FILM COATED ORAL at 17:01

## 2018-06-01 RX ADMIN — GABAPENTIN SCH MG: 100 CAPSULE ORAL at 09:22

## 2018-06-01 NOTE — P.PN
Subjective


Progress Note Date: 06/01/18





This patient is a 67-year-old right-handed white male who is being evaluated 

for symptoms of confusion and altered mental status.  Patient presented 

yesterday with mentation changes.  CAT scan of the brain revealed white matter 

ischemic changes.  Patient underwent MRI of the lumbar spine which does reveal 

significant degenerative changes.  There is a disc herniation noted as well.  

Disc herniation most notable at L5-S1 with extensive degenerative disc disease 

creating bilateral neural foraminal narrowing and spinal canal stenosis.  Given 

these MRI findings we have recommended a consultation with orthopedic spine 

surgery as outpatient.  Patient was also seen by psychiatry for evaluation of 

depression.  At this time their impression is that he has more of a in 

encephalopathy and does not require psychiatric intervention.  Cause of his 

encephalopathy possibly medication effect.  He has been depressed however due 

to the loss of his wife and November of last year.  We will continue to work 

with him and will await further recommendations from PT OT.  He was seen by Dr. Medina today and apparently is not a very good candidate for inpatient rehab as 

he is not cooperative.  He is also noted to have a poor attitude by Dr. Medina.  

Patient underwent MRI of the brain today as well which did reveal ventricular 

prominence and confluent white matter changes.  These findings could also be a 

form of chronic microvascular disease.  This is suggesting possibility of 

vascular dementia for this patient as well.  Patient is noted today to be up 

and ambulating in the hallway with the use of a walker.  He is showing 

significant improvement in his mental status.  He states he does have chronic 

back pain but is not been bothered recently with any severe radicular pain.  As 

noted his MRI of the lumbar spine was reviewed with him today in detail.  We 

recommend a follow-up with orthopedic surgery in the outpatient setting.  The 

patient's mental status is much improved today.  He is much more talkative and 

able to answer all questions appropriately.  He is being considered for 

possible discharge home tomorrow if he continues to show progress.  We will 

continue close follow-up for the patient.  His overall prognosis at this time 

remains guarded.





Objective





- Vital Signs


Vital signs: 


 Vital Signs











Temp  97.9 F   06/01/18 15:53


 


Pulse  66   06/01/18 16:00


 


Resp  16   06/01/18 16:00


 


BP  109/57   06/01/18 15:53


 


Pulse Ox  95   06/01/18 15:53








 Intake & Output











 05/31/18 06/01/18 06/01/18





 18:59 06:59 18:59


 


Intake Total 1080  600


 


Output Total   2000


 


Balance 1080  -1400


 


Intake:   


 


  Intake, IV Titration 600  





  Amount   


 


    Sodium Chloride 0.9% 1, 600  





    000 ml @ 75 mls/hr IV .   





    [id number] ONE Rx#:[id number]   


 


  Oral 480  600


 


Output:   


 


  Urine   2000


 


Other:   


 


  Voiding Method Diaper Diaper Urinal





   Diaper


 


  # Voids 1 3 5


 


  # Bowel Movements   2














- Exam





Physical examination:





PHYSICAL EXAMINATION: Patient is resting comfortably in bed. 


VITAL SIGNS: Blood pressure is [109/57]. Heart rate is [65]. Respiration is [16]

. Temperature is [97.9].


HEENT: Head is atraumatic, neck is supple, there were no carotid bruits.


CHEST: Lungs are clear to auscultation and percussion.  


CARDIAC: S1, S2 normal rate and rhythm. There is no murmur.


ABDOMEN: Soft and nontender. Bowel sounds are present.


EXTREMITIES:  There is no pedal edema.  Peripheral pulses are present.





Neurological examination:





Patient is much more awake and alert today.  He is oriented 3.  Speech is 

fluent with no evidence of any aphasia or dysarthria.  His memory and 

intellectual functions are appropriate for his age.  Cranial nerves II through 

XII are grossly intact.  Motor examination fails to reveal any focal weakness.  

Deep tendon reflexes are 1+ and symmetric.  Plantar responses flexor 

bilaterally.





- Labs


CBC & Chem 7: 


 05/31/18 00:23





 06/01/18 09:51


Labs: 


 Abnormal Lab Results - Last 24 Hours (Table)











  06/01/18 06/01/18 Range/Units





  08:20 09:51 


 


Chloride   96 L  ()  mmol/L


 


BUN   25 H  (9-20)  mg/dL


 


Glucose   106 H  (74-99)  mg/dL


 


Urine Protein  Trace H   (Negative)  


 


Urine Blood  Small H   (Negative)  


 


Ur Leukocyte Esterase  Large H   (Negative)  


 


Urine RBC  21 H   (0-5)  /hpf


 


Urine WBC  >182 H   (0-5)  /hpf


 


Urine WBC Clumps  Many H   (None)  /hpf


 


Urine Bacteria  Moderate H   (None)  /hpf














Assessment and Plan


(1) Acute encephalopathy


Current Visit: Yes   Status: Acute   Code(s): G93.40 - ENCEPHALOPATHY, 

UNSPECIFIED   SNOMED Code(s): 91744309


   





(2) Depression


Current Visit: Yes   Status: Acute   Code(s): F32.9 - MAJOR DEPRESSIVE DISORDER

, SINGLE EPISODE, UNSPECIFIED   SNOMED Code(s): 00972698


   





(3) Bilateral leg weakness


Current Visit: Yes   Status: Acute   Code(s): R29.898 - OTH SYMPTOMS AND SIGNS 

INVOLVING THE MUSCULOSKELETAL SYSTEM   SNOMED Code(s): 5635313


   





(4) Hyperlipidemia


Current Visit: No   Status: Acute   Code(s): E78.5 - HYPERLIPIDEMIA, 

UNSPECIFIED   SNOMED Code(s): 16390410


   


Plan: 





This patient is a 67-year-old male who was admitted initially for evaluation of 

altered mental status and weakness.  He underwent MRI of the brain and lumbar 

spine results of which are noted above.  Today he is showing significant 

improvement in mental status and is up and ambulating in the hallway with the 

use of his walker.  MRI does reveal some degenerative changes in the lumbar 

spine which can be followed up in the outpatient orthopedic spine surgery 

clinic.  MRI of the brain revealed nonspecific white matter changes consistent 

with vascular ischemia.  He may follow-up outpatient as needed.  Patient is 

been up and ambulating today and according to nursing staff is shown 

significant improvement in his overall mental status.  He does not appear to be 

depressed and is been cooperating very well with the nursing staff today.  

Patient is being considered for possible discharge home tomorrow.  So overall 

prognosis at this time remains guarded.

## 2018-06-01 NOTE — MR
EXAMINATION TYPE: MR brain/lspine wo con

 

DATE OF EXAM: 6/1/2018

 

COMPARISON: NONE

 

HISTORY: Patient with altered mental status and lethargy, bilateral leg weakness

 

BRAIN:

TECHNIQUE: 

Multiplanar, multisequence images of the brain and brainstem is performed without IV contrast.

 

FINDINGS: Diffusion weighted images demonstrate no evidence of a recent infarct or other diffusion ab
normality.  There is no extra-axial fluid collection. The ventricular system and cisternal spaces are
 symmetrically prominent compatible with age-related volume loss. Confluent periventricular white mat
ter limits evaluation for subtle ependymal edema. Numerous foci of T2/FLAIR hyperintensity are scatte
red throughout the subcortical and periventricular white matter. This is pronounced for the patient's
 age and moderate to severe burden.

 

Midline structures demonstrate normal morphology.  The craniocervical junction appears within normal 
limits. The dural venous sinuses appear patent. Mucosal thickening is seen within the sphenoid and et
hmoid sinuses. The remaining visualized sinuses are clear and the globes are intact.

 

LUMBAR SPINE:

TECHNIQUE: 

Multiplanar, multisequence images of the lumbar spine were acquired without intravenous contrast.    


 

FINDINGS: The lumbar spine vertebral bodies maintain normal vertebral body height and alignment. Conu
s medullaris is unremarkable terminating at L1. Bone marrow signal is within normal limits other than
 degenerative endplate changes and L1 vertebral body T2/T1 hyperintense hemangioma. Extensive multile
kush disc desiccation is seen.

 

L1-L2: There is a broad-based disc bulge without evidence of spinal canal stenosis or neural foramina
l narrowing.

 

L2-L3: There is a broad-based disc bulge, facet arthropathy and disc desiccation resulting in moderat
e right and mild left neural foraminal narrowing. There is mild spinal canal stenosis at this level.

 

L3-L4: There is a broad-based disc bulge and facet arthropathy resulting in mild right and moderate l
eft neural foraminal narrowing. There is mild spinal canal stenosis at this level.

 

L4-L5: There is a broad-based disc bulge and facet arthropathy resulting in mild bilateral neural for
aminal narrowing. No significant spinal canal stenosis is seen.

 

L5-S1: There is a central disc herniation superimposed upon a broad-based disc bulge creating severe 
right neural foraminal narrowing, severe left neural foraminal narrowing, and mild spinal canal steno
sis. Facet arthropathy is also seen.

 

IMPRESSION: 

1. Ventricular prominence likely relates to age-related volume loss although normal pressure hydrocep
halus should be considered in this patient with altered mental status. Additionally confluent periven
tricular white matter limits evaluation for transependymal edema.

2. Moderate to severe burden nonspecific white matter change in this patient's age group. Although fi
ndings most likely relate to chronic microangiopathy other etiologies such as demyelinating disease, 
vasculitides and less likely postinfectious etiology should also be considered.

3. Central disc herniation at L5-S1 in combination with extensive degenerative disc disease create se
ronni bilateral neural foraminal narrowing and mild spinal canal stenosis.

4. Multilevel degenerative disc disease crating mild spinal canal stenosis at L2-L4 and variable degr
ees of neural foraminal stenosis as described above.

## 2018-06-01 NOTE — PN
PROGRESS NOTE



DATE OF SERVICE:

06/01/2018



PRESENTING COMPLAINT:

Lethargic.



INTERVAL HISTORY:

This patient was admitted with acute metabolic encephalopathy felt to be a combination

of side effects of medications in the setting of acute renal failure.  This patient has

done remarkably well after hydration.  Renal function has improved.  The patient walked

in the hallway with a walker, did tolerate a diet.  Overall doing much better.  Other

neurological workup has been negative.



REVIEW OF SYSTEMS:

Done for constitutional, cardiovascular, GI, pulmonary; relevant findings as above.



CURRENT MEDICATIONS:

Reviewed. They include IV fluids.



PHYSICAL EXAMINATION:

Temperature 97.9, pulse 66, respiration 16, blood pressure 109/57, pulse ox 95% on room

air.

GENERAL APPEARANCE:  Sitting up, awake, answering questions.

EYES: Pupils equal. Conjunctivae normal.

HEENT: External appearance of nose and ears normal. Oral cavity normal.

NECK: JVD unable to assess. Mass not palpable.

RESPIRATORY: Effort normal.

LUNGS: Slightly decreased breath sounds.

CARDIOVASCULAR: First and second sounds normal. No edema.

ABDOMEN:  Soft, nontender.  Liver and spleen not palpable.

PSYCHIATRY: Alert and oriented x3. Mood and affect normal.



INVESTIGATIONS:

Potassium 3.9, BUN 25, creatinine 1.10.



ASSESSMENT:

1. Acute metabolic encephalopathy from side effects of medications in the setting of

    acute renal failure, significantly improved.

2. Acute renal failure from medications, including ACE inhibitor, hydrochlorothiazide,

    which has corrected.

3. Morbid obesity with body mass index of 43.1.

4. Chronic obstructive pulmonary disease.

5. Gastroesophageal reflux disease.

6. Hyperlipidemia.

7. Primary osteoarthritis.

8. Benign prostatic hypertrophy.

9. Restless legs syndrome.

10.Hiatal hernia.



PLAN:

Patient is overall doing much better.  Will change the patient's baclofen to 5 mg

p.r.n. and patient's renal-offensive drugs will be kept off.  Encouraged the patient to

ambulate.  Care was discussed with the patient.  Will discontinue the IV fluids later

today.  Neurological workup is showing some chronic changes.  Care was discussed with

the patient.





MMODL / IJN: [id number] / Job#: [id number]

## 2018-06-01 NOTE — P.CONS
History of Present Illness





- Chief Complaint


Mental status change and lower extremity weakness





- History of Present Illness





I had the opportunity to see patient for inpatient rehab consultation with 

regard to gait disturbance.  He was admitted to [facility] May 31 with mental 

status change, confusion, disorientation, visual change and lower extremity 

weakness.  Seen by Dr. PARDEEP [place].  Head CT and chest x-rays negative.  KUB with 

nonspecific gas pattern only.  PT prescribed and I added OT and speech.





Previous functional history as elicited from patient: 67-year-old right-handed 

white male who is , lives in one floor home alone.  Retired.  Describes 

independent with own cooking, laundry, driving, standing shower and gait 

without device.  Denies tobacco.  Occasional drink.  PMD is Dr. Bravo.





Family history of father with throat cancer.





Review of Systems





Review of systems:


ENT: Denies sneezes or discharge.


Eyes: Describes poor vision more so right eye than left eye.


Cardiac: Denies chest pain or palpitation.


Pulmonary: Denies cough or shortness of breath.


Gastrointestinal: Denies nausea, emesis, constipation, diarrhea.


Genitourinary: Denies discharge or frequency.


Musculoskeletal: Denies muscle or bone aches.


Neurologic: Generalized weakness but especially lowers.


Endocrine: Denies shakes or sweats.


Oncology: Denies cancers.


Dermatologic: Denies rash, itching, pruritus.


ALLERGY/immunology: Denies sneezes, rashes.








Past Medical History


Past Medical History: Asthma, COPD, CVA/TIA, GERD/Reflux, Hyperlipidemia, 

Osteoarthritis (OA), Prostate Disorder, Skin Disorder


Additional Past Medical History / Comment(s): Recent bilateral leg swelling 

which pt states is d/t neurontin, restless Leg Syndrome, low back pain and 

bilateral sciatica, hiatal hernia, stomach ulcer, TIA 8 years ago, vertigo, 

past  concussion from football injury.


History of Any Multi-Drug Resistant Organisms: None Reported


Past Surgical History: Appendectomy, Cholecystectomy, Heart Catheterization, 

Tonsillectomy


Additional Past Surgical History / Comment(s): Cardiac cath 18 normal, cyst 

removed from right hand, colonoscopy/polypectomy


Past Anesthesia/Blood Transfusion Reactions: Motion Sickness


Additional Past Anesthesia/Blood Transfusion Reaction / Comm: claustrophobia


Smoking Status: Former smoker





- Past Family History


  ** Mother


Family Medical History: Diabetes Mellitus


Additional Family Medical History / Comment(s): Mother  at the age of 86yrs.





  ** Father


Family Medical History: Cancer


Additional Family Medical History / Comment(s): Throat cancer and  at the 

age of 50yrs.





Medications and Allergies


 Home Medications











 Medication  Instructions  Recorded  Confirmed  Type


 


PARoxetine [Paxil] 20 mg PO QAM 03/02/15 05/31/18 History


 


amLODIPine [Norvasc] 10 mg PO DAILY 03/02/15 05/31/18 History


 


Fenofibrate,Micronized 134 mg PO HS 03/03/15 05/31/18 History





[Fenofibrate]    


 


Cetirizine HCl [Zyrtec] 10 mg PO DAILY 17 History


 


Oxybutynin Chloride [Oxybutynin 15 mg PO HS 17 History





Chloride ER]    


 


Ranitidine HCl [Zantac] 300 mg PO HS 17 History


 


Tamsulosin HCl [Flomax] 0.8 mg PO HS 17 History


 


Carvedilol [Coreg] 6.25 mg PO BID #60 tablet 17 Rx


 


Aspirin [Adult Low Dose Aspirin EC] 81 mg PO QAM 18 History


 


Melatonin 3 mg PO HS PRN 18 History


 


Naproxen 500 mg PO BID 18 History


 


traMADol HCl [Ultram] 50 mg PO TID 18 History


 


Baclofen 5 mg PO TID 18 History


 


Fluticasone Nasal Spray [Flonase 1 spray EA NOSTRIL DAILY 18 

History





Nasal Spray]    


 


Hydrochlorothiazide [Hydrodiuril] 50 mg PO DAILY 18 History


 


LORazepam [Ativan] 0.5 mg PO Q6H PRN 18 History


 


Lisinopril 40 mg PO DAILY 18 History


 


Potassium Chloride [Klor-Con 20] 20 meq PO DAILY 18 History


 


traZODone HCL [Desyrel] 100 mg PO HS 18 History











 Allergies











Allergy/AdvReac Type Severity Reaction Status Date / Time


 


atorvastatin [From Lipitor] Allergy  Unknown Verified 18 08:05


 


codeine Allergy  Unknown Verified 18 08:05














Physical Exam


Vitals: 


 Vital Signs











  Temp Pulse Pulse Resp BP Pulse Ox


 


 18 05:52  98.2 F  70   18  125/72  98


 


 18 22:45     18  


 


 18 21:41       93 L


 


 18 21:11  98.2 F   69  18  124/70  98


 


 18 16:00    62  14  


 


 18 15:00       98


 


 18 14:42  97.5 F L   62  14  148/68  98


 


 18 08:23    65   


 


 18 08:00    65  16  








 Intake and Output











 18





 14:59 22:59 06:59


 


Intake Total 1080  


 


Balance 1080  


 


Intake:   


 


  Intake, IV Titration 600  





  Amount   


 


    Sodium Chloride 0.9% 1, 600  





    000 ml @ 75 mls/hr IV .   





    [id number] ONE Rx#:[id number]   


 


  Oral 480  


 


Other:   


 


  Voiding Method Diaper Diaper 


 


  # Voids 1 3 3














Skin: Good color, texture, turgor.


General: Overweight build and comfortable appearance.


Head: Normocephalic, atraumatic.


Eyes: Symmetric.  Pupils equal round.


Ears: Symmetric.  Hearing within normal limits.


Mouth: Clear.


Neck: Supple.  Carotid without bruit.


Cardiac: Regular rate and rhythm.


Lungs: Clear anteriorly and posteriorly.


Abdomen: Soft active nontender.


Extremities: Normal tone.


Neurological: Mental status: Alert, cooperative, depressed.


Cranial nerves: Symmetric facial tone and trapezius.


Motor: Poor movement all 4 limbs.


Sensation: Intact throughout.


DTRs: Absent throughout.


Mobility: Would require definite assistance, most likely total for bed mobility.





Results


CBC & Chem 7: 


 18 00:23





 18 00:23


Chest x-ray: report reviewed (Negative.)


Abdominal x-ray: report reviewed (KUB with nonspecific gas pattern.)


CT Scan - head: report reviewed (Negative.)





Assessment and Plan


(1) Acute encephalopathy


Current Visit: Yes   Status: Acute   Code(s): G93.40 - ENCEPHALOPATHY, 

UNSPECIFIED   SNOMED Code(s): 96272502


   


Plan: 





Depression:


1.  Gait disturbance.


2.  Mental status change with acute encephalopathy.


3.  Lower extremity weakness.


4.  COPD with asthma.


5.  Dyslipidemia.


6.  Daria arthritis.


7.  GERD.





Comments and plan: PT prescribed and I added OT and speech.  Follow therapies 

with yourself.  Rehab prognosis currently guarded due to patient's poor 

attitude.

## 2018-06-01 NOTE — P.CN
Psychiatric Consult





- .


Consult date: 18


Consult:: 





18 14:27


I tried to see this patient twice this afternoon for psych consult regarding 

"acute depression". Apparently patient is still at MRI and is not available.  I 

have reviewed the chart and talk to the nurse.  The neurology and medical 

consult notes indicate patient has encephalopathy.  Computed tomography scan of 

the brain shows bilateral white matter hypodensities, nonspecific but likely 

due to chronic microvascular ischemic changes, EEG has been within normal 

limits.  Patient's nurse told me that patient's wife had  about 6 months 

ago and has been depressed.  He was on Paxil 20 mg twice a day, tramadol 50 mg 

3 times a day baclofen 10 mg 3 times a day and Neurontin 300 mg 3 times a day.  

But now he is on baclofen 5 mg 3 times a day Neurontin 100 mg 3 times a day and 

Paxil 20 mg twice a day.





Generally speaking "depression" does not start acutely.  But, sadness, 

disappointment and grief do.  Encephalopathy, especially when combined with CNS 

suppressants like narcotics (tramadol), baclofen and Neurontin can cause 

clinical presentation which can be hard to distinguish from depression.





Suggestion:


Continuing tramadol baclofen and Neurontin can make encephalopathy worse.


Once the patient recovers from encephalopathy, if you think he is still 

depressed, I will come and reevaluate him.

## 2018-06-02 VITALS — TEMPERATURE: 97.6 F | HEART RATE: 65 BPM

## 2018-06-02 VITALS — DIASTOLIC BLOOD PRESSURE: 66 MMHG | SYSTOLIC BLOOD PRESSURE: 138 MMHG

## 2018-06-02 LAB
ANION GAP SERPL CALC-SCNC: 9 MMOL/L
BUN SERPL-SCNC: 23 MG/DL (ref 9–20)
CALCIUM SPEC-MCNC: 9.5 MG/DL (ref 8.4–10.2)
CHLORIDE SERPL-SCNC: 97 MMOL/L (ref 98–107)
CO2 SERPL-SCNC: 33 MMOL/L (ref 22–30)
GLUCOSE SERPL-MCNC: 89 MG/DL (ref 74–99)
POTASSIUM SERPL-SCNC: 3.6 MMOL/L (ref 3.5–5.1)
SODIUM SERPL-SCNC: 139 MMOL/L (ref 137–145)

## 2018-06-02 RX ADMIN — ASPIRIN 81 MG CHEWABLE TABLET SCH MG: 81 TABLET CHEWABLE at 08:04

## 2018-06-02 RX ADMIN — CARVEDILOL SCH MG: 6.25 TABLET, FILM COATED ORAL at 08:04

## 2018-06-03 NOTE — DS
DISCHARGE SUMMARY



DATE OF ADMISSION:

5/31/18



DATE OF DISCHARGE:

6/3/18.



FINAL DIAGNOSES:

1. Acute metabolic encephalopathy from side effect of medication in setting of acute

    renal failure.

2. Acute renal failure from medications including ACE inhibitor, hydrochlorothiazide,

    resolved.

3. Morbid obesity, BMI 43.1.

4. Chronic obstructive pulmonary disease.

5. Gastroesophageal reflux disease.

6. Hyperlipidemia.

7. Primary osteoarthritis.

8. Benign prostatic hypertrophy.

9. Restless legs syndrome.

10.Hiatal hernia.



HOSPITAL COURSE:

This patient presents rather encephalopathic, lethargic, felt to be a combination of

medications.  The patient on presentation had a BUN of 42, creatinine 1.80.  By the

time of discharge, BUN and creatinine did come down to 23 and 1.13.  The patient's

cetirizine, naproxen, Ultram, hydrochlorothiazide, lisinopril, Ativan, potassium were

all discontinued.  The patient doing much better at the time of discharge.  Walking

unsupported.  The patient is told to lose weight and the patient is AO x3, conversing

normally.  The patient had very minimal pain.  The patient did have a neurological

workup by Dr. Abebe.  All other workup was negative and included brain CT, EEG and

brain MRI that showed some chronic changes.  The patient also seen by Dr. Henning

from Psychiatry.



EXAM:

Lungs: Fair air entry.

Psych: AO x3.



DISCHARGE MEDICATIONS:

1. Paxil 20 mg a day.

2. Norvasc 10 mg a day.

3. Tricor 134 mg q.h.s.

4. Oxybutynin ER 50 mg q.h.s.

5. Zantac 300 mg q.h.s.

6. Flomax 0.8 mg at bedtime.

7. Coreg 6.25 p.o. b.i.d.

8. Aspirin 81 mg a day.

9. Melatonin 3 mg q.h.s. p.r.n.

10.Trazodone 100 mg q.h.s.

11.Baclofen 5 mg p.o. t.i.d. p.r.n.

12.Naproxen 250 mg p.o. b.i.d. p.r.n.

Follow Dr. Bravo in 3 days, follow with Dr. Abebe in one week. Labs: BMP in 3-5

days.  Copy to Dr. Bravo.





TERESA / IJN: [id number] / Job#: [id number]

## 2018-08-12 ENCOUNTER — HOSPITAL ENCOUNTER (OUTPATIENT)
Dept: HOSPITAL 47 - EC | Age: 68
Setting detail: OBSERVATION
LOS: 1 days | Discharge: HOME | End: 2018-08-13
Attending: INTERNAL MEDICINE | Admitting: INTERNAL MEDICINE
Payer: MEDICARE

## 2018-08-12 VITALS — BODY MASS INDEX: 41.8 KG/M2

## 2018-08-12 DIAGNOSIS — Z79.51: ICD-10-CM

## 2018-08-12 DIAGNOSIS — R79.89: ICD-10-CM

## 2018-08-12 DIAGNOSIS — I25.10: ICD-10-CM

## 2018-08-12 DIAGNOSIS — M54.41: ICD-10-CM

## 2018-08-12 DIAGNOSIS — J44.9: ICD-10-CM

## 2018-08-12 DIAGNOSIS — F41.9: ICD-10-CM

## 2018-08-12 DIAGNOSIS — M54.42: ICD-10-CM

## 2018-08-12 DIAGNOSIS — G25.81: ICD-10-CM

## 2018-08-12 DIAGNOSIS — I10: ICD-10-CM

## 2018-08-12 DIAGNOSIS — E66.01: ICD-10-CM

## 2018-08-12 DIAGNOSIS — Z79.899: ICD-10-CM

## 2018-08-12 DIAGNOSIS — Z88.8: ICD-10-CM

## 2018-08-12 DIAGNOSIS — Z87.820: ICD-10-CM

## 2018-08-12 DIAGNOSIS — F32.9: ICD-10-CM

## 2018-08-12 DIAGNOSIS — Z87.891: ICD-10-CM

## 2018-08-12 DIAGNOSIS — M19.90: ICD-10-CM

## 2018-08-12 DIAGNOSIS — Z79.82: ICD-10-CM

## 2018-08-12 DIAGNOSIS — K21.9: ICD-10-CM

## 2018-08-12 DIAGNOSIS — G89.29: ICD-10-CM

## 2018-08-12 DIAGNOSIS — Z86.73: ICD-10-CM

## 2018-08-12 DIAGNOSIS — E78.5: ICD-10-CM

## 2018-08-12 DIAGNOSIS — M79.89: ICD-10-CM

## 2018-08-12 DIAGNOSIS — Z88.5: ICD-10-CM

## 2018-08-12 DIAGNOSIS — Z80.8: ICD-10-CM

## 2018-08-12 DIAGNOSIS — I16.0: Primary | ICD-10-CM

## 2018-08-12 DIAGNOSIS — R07.89: ICD-10-CM

## 2018-08-12 DIAGNOSIS — N40.0: ICD-10-CM

## 2018-08-12 DIAGNOSIS — Z87.11: ICD-10-CM

## 2018-08-12 DIAGNOSIS — Z90.49: ICD-10-CM

## 2018-08-12 DIAGNOSIS — Z83.3: ICD-10-CM

## 2018-08-12 LAB
ALBUMIN SERPL-MCNC: 3.6 G/DL (ref 3.5–5)
ALP SERPL-CCNC: 34 U/L (ref 38–126)
ALT SERPL-CCNC: 24 U/L (ref 21–72)
ANION GAP SERPL CALC-SCNC: 6 MMOL/L
APTT BLD: 24.4 SEC (ref 22–30)
AST SERPL-CCNC: 23 U/L (ref 17–59)
BASOPHILS # BLD AUTO: 0.1 K/UL (ref 0–0.2)
BASOPHILS NFR BLD AUTO: 1 %
BUN SERPL-SCNC: 12 MG/DL (ref 9–20)
CALCIUM SPEC-MCNC: 9 MG/DL (ref 8.4–10.2)
CHLORIDE SERPL-SCNC: 102 MMOL/L (ref 98–107)
CHOLEST SERPL-MCNC: 130 MG/DL (ref ?–200)
CK SERPL-CCNC: 50 U/L (ref 55–170)
CK SERPL-CCNC: 56 U/L (ref 55–170)
CO2 SERPL-SCNC: 31 MMOL/L (ref 22–30)
D DIMER PPP FEU-MCNC: 1.08 MG/L FEU (ref ?–0.6)
EOSINOPHIL # BLD AUTO: 0.3 K/UL (ref 0–0.7)
EOSINOPHIL NFR BLD AUTO: 3 %
ERYTHROCYTE [DISTWIDTH] IN BLOOD BY AUTOMATED COUNT: 4.44 M/UL (ref 4.3–5.9)
ERYTHROCYTE [DISTWIDTH] IN BLOOD: 13.7 % (ref 11.5–15.5)
GLUCOSE SERPL-MCNC: 104 MG/DL (ref 74–99)
HCT VFR BLD AUTO: 40.6 % (ref 39–53)
HDLC SERPL-MCNC: 37 MG/DL (ref 40–60)
HGB BLD-MCNC: 13.1 GM/DL (ref 13–17.5)
INR PPP: 1.2 (ref ?–1.2)
LDLC SERPL CALC-MCNC: 72 MG/DL (ref 0–99)
LYMPHOCYTES # SPEC AUTO: 1.4 K/UL (ref 1–4.8)
LYMPHOCYTES NFR SPEC AUTO: 19 %
MAGNESIUM SPEC-SCNC: 2 MG/DL (ref 1.6–2.3)
MCH RBC QN AUTO: 29.5 PG (ref 25–35)
MCHC RBC AUTO-ENTMCNC: 32.3 G/DL (ref 31–37)
MCV RBC AUTO: 91.4 FL (ref 80–100)
MONOCYTES # BLD AUTO: 0.6 K/UL (ref 0–1)
MONOCYTES NFR BLD AUTO: 8 %
NEUTROPHILS # BLD AUTO: 5 K/UL (ref 1.3–7.7)
NEUTROPHILS NFR BLD AUTO: 67 %
PLATELET # BLD AUTO: 250 K/UL (ref 150–450)
POTASSIUM SERPL-SCNC: 4 MMOL/L (ref 3.5–5.1)
PROT SERPL-MCNC: 6.4 G/DL (ref 6.3–8.2)
PT BLD: 11.4 SEC (ref 9–12)
SODIUM SERPL-SCNC: 139 MMOL/L (ref 137–145)
TRIGL SERPL-MCNC: 103 MG/DL (ref ?–150)
TROPONIN I SERPL-MCNC: <0.012 NG/ML (ref 0–0.03)
TROPONIN I SERPL-MCNC: <0.012 NG/ML (ref 0–0.03)
WBC # BLD AUTO: 7.5 K/UL (ref 3.8–10.6)

## 2018-08-12 PROCEDURE — 99285 EMERGENCY DEPT VISIT HI MDM: CPT

## 2018-08-12 PROCEDURE — 93005 ELECTROCARDIOGRAM TRACING: CPT

## 2018-08-12 PROCEDURE — 71046 X-RAY EXAM CHEST 2 VIEWS: CPT

## 2018-08-12 PROCEDURE — 85025 COMPLETE CBC W/AUTO DIFF WBC: CPT

## 2018-08-12 PROCEDURE — 84484 ASSAY OF TROPONIN QUANT: CPT

## 2018-08-12 PROCEDURE — 85379 FIBRIN DEGRADATION QUANT: CPT

## 2018-08-12 PROCEDURE — 94760 N-INVAS EAR/PLS OXIMETRY 1: CPT

## 2018-08-12 PROCEDURE — 85610 PROTHROMBIN TIME: CPT

## 2018-08-12 PROCEDURE — 85730 THROMBOPLASTIN TIME PARTIAL: CPT

## 2018-08-12 PROCEDURE — 36415 COLL VENOUS BLD VENIPUNCTURE: CPT

## 2018-08-12 PROCEDURE — 83735 ASSAY OF MAGNESIUM: CPT

## 2018-08-12 PROCEDURE — 80053 COMPREHEN METABOLIC PANEL: CPT

## 2018-08-12 PROCEDURE — 80061 LIPID PANEL: CPT

## 2018-08-12 PROCEDURE — 82550 ASSAY OF CK (CPK): CPT

## 2018-08-12 PROCEDURE — 71275 CT ANGIOGRAPHY CHEST: CPT

## 2018-08-12 PROCEDURE — 82553 CREATINE MB FRACTION: CPT

## 2018-08-12 RX ADMIN — CARVEDILOL SCH MG: 12.5 TABLET, FILM COATED ORAL at 20:44

## 2018-08-12 RX ADMIN — NAPROXEN PRN MG: 250 TABLET ORAL at 23:27

## 2018-08-12 RX ADMIN — NITROGLYCERIN SCH INCH: 20 OINTMENT TOPICAL at 21:59

## 2018-08-12 NOTE — XR
EXAMINATION TYPE: XR chest 2V

 

DATE OF EXAM: 8/12/2018

 

COMPARISON: 5/31/2018

 

HISTORY: Chest pain. History of COPD and asthma.

 

TECHNIQUE:  Frontal and lateral views of the chest are obtained.

 

FINDINGS:  There is minimal bibasilar subsegmental strand-like atelectasis. There is no focal air spa
ce opacity, pleural effusion, or pneumothorax seen.  The cardiac silhouette size is within normal montes
its. Lining of the diaphragms on the lateral image is compatible with the patient's provided history 
of COPD. The osseous structures are intact. Mild multilevel degenerative changes of thoracic spine ar
e noted.

 

IMPRESSION:  Normal bibasilar strand-like subsegmental atelectasis. No focal consolidation to suggest
 pneumonia.

## 2018-08-12 NOTE — ED
General Adult HPI





- General


Source: patient, RN notes reviewed


Mode of arrival: wheelchair


Limitations: no limitations





<Chin Goode - Last Filed: 18 16:22>





<Shanique Lemon - Last Filed: 18 18:07>





- General


Chief complaint: Chest Pain


Stated complaint: Chest pain


Time Seen by Provider: 18 15:10





- History of Present Illness


Initial comments: 





This is a 68-year-old male comes in with a past medical history significant for 

high blood pressure.  Patient states he's been having chest pain high blood 

pressure mild headache and mild shortness of breath.  Patient states his been 

intermittent for about a week he was seen and worked up at [facility] 

but they didn't find anything.  Patient states the pains have continued and 

worsened so he decided come to this emergency department.  Patient denies any 

fever chills or cough.  Patient denies abdominal pain patient denies nausea 

vomiting diarrhea.  Patient denies lightheadedness but states he is dizzy when 

he walks and has been that way for one week.  Patient states had multiple CAT 

scans at [facility] they did not find anything.  Patient states he 

thinks his dizziness is worse when his blood pressure is high and he bent over 

200 lately. (Chin Goode)





- Related Data


 Home Medications











 Medication  Instructions  Recorded  Confirmed


 


PARoxetine [Paxil] 40 mg PO DAILY 03/02/15 08/12/18


 


Fenofibrate,Micronized 134 mg PO DAILY 03/03/15 08/12/18





[Fenofibrate]   


 


Ranitidine HCl [Zantac] 300 mg PO HS 17


 


Tamsulosin HCl [Flomax] 0.8 mg PO HS 17


 


Aspirin [Adult Low Dose Aspirin EC] 81 mg PO QAM 18


 


Melatonin 3 mg PO HS PRN 18


 


traZODone HCL [Desyrel] 200 mg PO HS 18


 


Amitriptyline HCl [Elavil] 25 mg PO HS 18


 


Carvedilol [Coreg] 25 mg PO BID 18


 


Fluticasone Nasal Spray [Flonase 1 spray EA NOSTRIL DAILY 18





Nasal Spray]   


 


Furosemide [Lasix] 10 mg PO DAILY 18


 


LORazepam [Ativan] 0.5 mg PO Q6H PRN 18








 Previous Rx's











 Medication  Instructions  Recorded


 


Baclofen 5 mg PO TID PRN #1 18


 


Naproxen [Naprosyn] 250 mg PO BID PRN #20 tab 18











 Allergies











Allergy/AdvReac Type Severity Reaction Status Date / Time


 


atorvastatin [From Lipitor] Allergy  Unknown Verified 18 16:04


 


codeine AdvReac  Nausea & Verified 18 16:04





   Vomiting  


 


gabapentin AdvReac  Hallucinati Verified 18 16:04





   ons  














Review of Systems


ROS Other: All systems not noted in ROS Statement are negative.





<Chin Goode - Last Filed: 18 16:22>


ROS Other: All systems not noted in ROS Statement are negative.





<Shanique Lemon - Last Filed: 18 18:07>


ROS Statement: 


Those systems with pertinent positive or pertinent negative responses have been 

documented in the HPI.








Past Medical History


Past Medical History: Asthma, COPD, CVA/TIA, GERD/Reflux, Hyperlipidemia, 

Osteoarthritis (OA), Prostate Disorder, Skin Disorder


Additional Past Medical History / Comment(s): Recent bilateral leg swelling 

which pt states is d/t neurontin, restless Leg Syndrome, low back pain and 

bilateral sciatica, hiatal hernia, stomach ulcer, TIA 8 years ago, vertigo, 

past  concussion from football injury.


History of Any Multi-Drug Resistant Organisms: None Reported


Past Surgical History: Appendectomy, Cholecystectomy, Heart Catheterization, 

Tonsillectomy


Additional Past Surgical History / Comment(s): Cardiac cath 18 normal, cyst 

removed from right hand, colonoscopy/polypectomy


Past Anesthesia/Blood Transfusion Reactions: Motion Sickness


Additional Past Anesthesia/Blood Transfusion Reaction / Comment(s): 

claustrophobia


Past Psychological History: Anxiety, Depression


Smoking Status: Former smoker


Past Alcohol Use History: None Reported


Past Drug Use History: None Reported





- Past Family History


  ** Mother


Family Medical History: Diabetes Mellitus


Additional Family Medical History / Comment(s): Mother  at the age of 86yrs.





  ** Father


Family Medical History: Cancer


Additional Family Medical History / Comment(s): Throat cancer and  at the 

age of 50yrs.





<Chin Goode - Last Filed: 18 16:22>





General Exam


Limitations: no limitations





<Chin Goode - Last Filed: 18 16:22>





<Shanique Lemon - Last Filed: 18 18:07>





- General Exam Comments


Initial Comments: 





GENERAL:


Patient is well-developed and well-nourished.  Patient is nontoxic and well-

hydrated and is in mild distress.





ENT:


Neck is soft and supple.  No significant lymphadenopathy is noted.  Oropharynx 

is clear.  Moist mucous membranes.  Neck has full range of motion without 

eliciting any pain. 





EYES:


The sclera were anicteric and conjunctiva were pink and moist.  Extraocular 

movements were intact and pupils were equal round and reactive to light.  

Eyelids were unremarkable.





PULMONARY:


Unlabored respirations.  Good breath sounds bilaterally.  No audible rales 

rhonchi or wheezing was noted.





CARDIOVASCULAR:


There is a regular rate and rhythm without any murmurs gallops or rubs.  





ABDOMEN:


Soft and nontender with normal bowel sounds.  No palpable organomegaly was 

noted.  There is no palpable pulsatile mass.





SKIN:


Skin is clear with no lesions or rashes and otherwise unremarkable.





NEUROLOGIC:


Patient is alert and oriented x3.  Cranial nerves II through XII are grossly 

intact.  Motor and sensory are also intact.  Normal speech, volume and content.

  Symmetrical smile. 





MUSCULOSKELETAL:


Normal extremities with adequate strength and full range of motion.  No lower 

extremity swelling or edema.  





LYMPHATICS:


No significant lymphadenopathy is noted





PSYCHIATRIC:


Normal psychiatric evaluation.  Normal interpersonal interactions appears 

functionally intact in deals appropriately with others.  No signs of 

depression.  No signs of anxiety.   (Chin Goode)





Course





<Chin Goode - Last Filed: 18 16:22>





<Shanique Lemon - Last Filed: 18 18:07>





 Vital Signs











  18





  15:08 15:45 16:15


 


Temperature 97.8 F  


 


Pulse Rate 60 59 L 58 L


 


Respiratory 18 20 18





Rate   


 


Blood Pressure 181/100 150/71 153/82


 


O2 Sat by Pulse 97 98 99





Oximetry   














  18





  17:15


 


Temperature 


 


Pulse Rate 57 L


 


Respiratory 18





Rate 


 


Blood Pressure 160/82


 


O2 Sat by Pulse 99





Oximetry 











Reason was reassessed at 1800, CT angios unremarkable her blood pressure is 163 

systolic CBC troponin compressive metabolic panel are unremarkable but his 

ongoing chest pain is to be evaluated further and I recommended admission he 

agrees with theplan.  He will be admitted to Dr. Pedroza's service (Shanique Lemon)





Medical Decision Making





- Lab Data


Result diagrams: 


 18 15:40





 18 15:40





<Chin Goode - Last Filed: 18 16:22>





- Lab Data


Result diagrams: 


 18 15:40





 18 15:40





<Shanique Lemon - Last Filed: 18 18:07>





- Medical Decision Making





EKG shows a normal sinus rhythm at 71 bpm WV interval is 182 QRS is 82 QT 

interval 408 QTC is 443.  Patient's EKG shows no ST segment elevation or 

depression or T wave abnormalities are noted.





Dr. Domingo will be taking over the care of the patient at 4:30. (Chin Goode)





- Lab Data





 Lab Results











  18 Range/Units





  15:40 15:40 15:40 


 


WBC   7.5   (3.8-10.6)  k/uL


 


RBC   4.44   (4.30-5.90)  m/uL


 


Hgb   13.1   (13.0-17.5)  gm/dL


 


Hct   40.6   (39.0-53.0)  %


 


MCV   91.4   (80.0-100.0)  fL


 


MCH   29.5   (25.0-35.0)  pg


 


MCHC   32.3   (31.0-37.0)  g/dL


 


RDW   13.7   (11.5-15.5)  %


 


Plt Count   250   (150-450)  k/uL


 


Neutrophils %   67   %


 


Lymphocytes %   19   %


 


Monocytes %   8   %


 


Eosinophils %   3   %


 


Basophils %   1   %


 


Neutrophils #   5.0   (1.3-7.7)  k/uL


 


Lymphocytes #   1.4   (1.0-4.8)  k/uL


 


Monocytes #   0.6   (0-1.0)  k/uL


 


Eosinophils #   0.3   (0-0.7)  k/uL


 


Basophils #   0.1   (0-0.2)  k/uL


 


PT     (9.0-12.0)  sec


 


INR     (<1.2)  


 


APTT     (22.0-30.0)  sec


 


D-Dimer     (<0.60)  mg/L FEU


 


Sodium    139  (137-145)  mmol/L


 


Potassium    4.0  (3.5-5.1)  mmol/L


 


Chloride    102  ()  mmol/L


 


Carbon Dioxide    31 H  (22-30)  mmol/L


 


Anion Gap    6  mmol/L


 


BUN    12  (9-20)  mg/dL


 


Creatinine    0.90  (0.66-1.25)  mg/dL


 


Est GFR (CKD-EPI)AfAm    >90  (>60 ml/min/1.73 sqM)  


 


Est GFR (CKD-EPI)NonAf    86  (>60 ml/min/1.73 sqM)  


 


Glucose    104 H  (74-99)  mg/dL


 


Calcium    9.0  (8.4-10.2)  mg/dL


 


Magnesium    2.0  (1.6-2.3)  mg/dL


 


Total Bilirubin    0.4  (0.2-1.3)  mg/dL


 


AST    23  (17-59)  U/L


 


ALT    24  (21-72)  U/L


 


Alkaline Phosphatase    34 L  ()  U/L


 


Total Creatine Kinase  56    ()  U/L


 


CK-MB (CK-2)  0.9    (0.0-2.4)  ng/mL


 


CK-MB (CK-2) Rel Index  1.6    


 


Troponin I  <0.012    (0.000-0.034)  ng/mL


 


Total Protein    6.4  (6.3-8.2)  g/dL


 


Albumin    3.6  (3.5-5.0)  g/dL














  18 Range/Units





  15:40 


 


WBC   (3.8-10.6)  k/uL


 


RBC   (4.30-5.90)  m/uL


 


Hgb   (13.0-17.5)  gm/dL


 


Hct   (39.0-53.0)  %


 


MCV   (80.0-100.0)  fL


 


MCH   (25.0-35.0)  pg


 


MCHC   (31.0-37.0)  g/dL


 


RDW   (11.5-15.5)  %


 


Plt Count   (150-450)  k/uL


 


Neutrophils %   %


 


Lymphocytes %   %


 


Monocytes %   %


 


Eosinophils %   %


 


Basophils %   %


 


Neutrophils #   (1.3-7.7)  k/uL


 


Lymphocytes #   (1.0-4.8)  k/uL


 


Monocytes #   (0-1.0)  k/uL


 


Eosinophils #   (0-0.7)  k/uL


 


Basophils #   (0-0.2)  k/uL


 


PT  11.4  (9.0-12.0)  sec


 


INR  1.2 H  (<1.2)  


 


APTT  24.4  (22.0-30.0)  sec


 


D-Dimer  1.08 H  (<0.60)  mg/L FEU


 


Sodium   (137-145)  mmol/L


 


Potassium   (3.5-5.1)  mmol/L


 


Chloride   ()  mmol/L


 


Carbon Dioxide   (22-30)  mmol/L


 


Anion Gap   mmol/L


 


BUN   (9-20)  mg/dL


 


Creatinine   (0.66-1.25)  mg/dL


 


Est GFR (CKD-EPI)AfAm   (>60 ml/min/1.73 sqM)  


 


Est GFR (CKD-EPI)NonAf   (>60 ml/min/1.73 sqM)  


 


Glucose   (74-99)  mg/dL


 


Calcium   (8.4-10.2)  mg/dL


 


Magnesium   (1.6-2.3)  mg/dL


 


Total Bilirubin   (0.2-1.3)  mg/dL


 


AST   (17-59)  U/L


 


ALT   (21-72)  U/L


 


Alkaline Phosphatase   ()  U/L


 


Total Creatine Kinase   ()  U/L


 


CK-MB (CK-2)   (0.0-2.4)  ng/mL


 


CK-MB (CK-2) Rel Index   


 


Troponin I   (0.000-0.034)  ng/mL


 


Total Protein   (6.3-8.2)  g/dL


 


Albumin   (3.5-5.0)  g/dL














Disposition





<Chin Goode - Last Filed: 18 16:22>





<Shanique Lemon - Last Filed: 18 18:07>


Clinical Impression: 


 Chest pain





Disposition: ADMITTED AS IP TO THIS [facility]


Condition: Good


Referrals: 


Michael Bravo MD [Primary Care Provider] - 1-2 days

## 2018-08-12 NOTE — CT
EXAMINATION TYPE: CT chest angio for PE

 

DATE OF EXAM: 8/12/2018

 

COMPARISON: NONE

 

HISTORY: Chest pain x 4 days

 

CT DLP: 566.4 mGycm. Automated Exposure Control for Dose Reduction was Utilized.

 

 

CONTRAST: 

CTA scan of the thorax is performed with IV Contrast, patient injected with 80 mL of Isovue 300, pulm
onary embolism protocol.  MIP Images are created on CT scanner and reviewed.

 

FINDINGS:

 

LUNGS: There is minimal bibasilar subsegmental atelectasis. The lungs are grossly clear, there is no 
concerning parenchymal mass or nodule identified.   There is no pleural effusion or pneumothorax seen
.  The tracheobronchial tree is patent.

 

MEDIASTINUM: There is satisfactory enhancement of the pulmonary artery and its branches, there is no 
CT evidence for pulmonary embolism.  There are no greater than 1 cm hilar or mediastinal lymph nodes.
   No cardiomegaly or pericardial effusion is seen. Mild left main and left anterior descending coron
jesica artery calcifications are seen.

 

OTHER: Mild multilevel degenerative change of the thoracic spine is noted.

 

IMPRESSION: 

1. No evidence of pulmonary embolus.

2. Minimal bibasilar subsegmental atelectasis.

3. Mild coronary artery calcifications.

## 2018-08-13 VITALS — TEMPERATURE: 98.1 F | HEART RATE: 63 BPM

## 2018-08-13 VITALS — SYSTOLIC BLOOD PRESSURE: 164 MMHG | DIASTOLIC BLOOD PRESSURE: 89 MMHG

## 2018-08-13 VITALS — RESPIRATION RATE: 16 BRPM

## 2018-08-13 LAB
CK SERPL-CCNC: 42 U/L (ref 55–170)
TROPONIN I SERPL-MCNC: <0.012 NG/ML (ref 0–0.03)

## 2018-08-13 RX ADMIN — NAPROXEN PRN MG: 250 TABLET ORAL at 09:06

## 2018-08-13 RX ADMIN — CARVEDILOL SCH MG: 12.5 TABLET, FILM COATED ORAL at 08:23

## 2018-08-13 RX ADMIN — NITROGLYCERIN SCH: 20 OINTMENT TOPICAL at 14:32

## 2018-08-13 RX ADMIN — NITROGLYCERIN SCH: 20 OINTMENT TOPICAL at 05:19

## 2018-08-13 NOTE — P.CRDCN
History of Present Illness


Consult date: 18


Chief complaint: Chest discomfort


History of present illness: 





This is a pleasant 68-year-old gentleman who sees Dr. Denson in the office 

as an outpatient with a past medical history significant for hypertension as 

well as mild CAD based on heart catheterization was performed in 2018 

and also obesity, presented to the hospital complaining of chest discomfort.





The patient has noticed that his blood pressure has not been well-controlled 

lately.  His blood pressure at home before he presented to the hospital was 

more than 200 mmHg systolic.  A Betadine the blood pressure goes up the patient 

experience discomfort in the chest area no shortness of breath.  No dizziness 

or lightheadedness.  No syncope.  The chest discomfort is atypical and he 

described it as a sharp kind of discomfort lasted only for a few seconds.  Its 

in the middle of the chest.  And no radiation.





The EKG showed sinus rhythm without any significant ST or T-wave abnormalities.

  The cardiac enzymes were checked and came in to be unremarkable.  The d-dimer 

came in to be alleviated but the computed tomography scan of the chest did not 

show any evidence of PE.  The patient blood pressure in the hospital was not 

well-controlled on Coreg 25 mg by mouth twice a day.  I am going to add Norvasc 

at 5 mg by mouth daily to the current medical regimen.





From the cardiovascular standpoint of view, the patient can be discharged home.





Past Medical History


Past Medical History: Asthma, COPD, CVA/TIA, GERD/Reflux, Hyperlipidemia, 

Osteoarthritis (OA), Prostate Disorder, Skin Disorder


Additional Past Medical History / Comment(s): Recent bilateral leg swelling 

which pt states is d/t neurontin, restless Leg Syndrome, low back pain and 

bilateral sciatica, hiatal hernia, stomach ulcer, TIA 8 years ago, vertigo, 

past  concussion from football injury.


History of Any Multi-Drug Resistant Organisms: None Reported


Past Surgical History: Appendectomy, Cholecystectomy, Heart Catheterization, 

Tonsillectomy


Additional Past Surgical History / Comment(s): Cardiac cath 18 normal, cyst 

removed from right hand, colonoscopy/polypectomy


Past Anesthesia/Blood Transfusion Reactions: Motion Sickness


Additional Past Anesthesia/Blood Transfusion Reaction / Comment(s): 

claustrophobia


Past Psychological History: Anxiety, Depression


Additional Psychological History / Comment(s): Pt resides alone.  He states he 

has significant depression d/t spouses death fall of . Pt uses no assistive 

device.  He drives.  Pt states his ronna was managing his medications.


Smoking Status: Former smoker


Past Alcohol Use History: None Reported


Additional Past Alcohol Use History / Comment(s): Started smoking at age 13, 

smoked 1 1/2 ppd and quit 1987


Past Drug Use History: None Reported





- Past Family History


  ** Mother


Family Medical History: Diabetes Mellitus


Additional Family Medical History / Comment(s): Mother  at the age of 86yrs.





  ** Father


Family Medical History: Cancer


Additional Family Medical History / Comment(s): Throat cancer and  at the 

age of 50yrs.





Medications and Allergies


 Home Medications











 Medication  Instructions  Recorded  Confirmed  Type


 


PARoxetine [Paxil] 40 mg PO DAILY 03/02/15 08/12/18 History


 


Fenofibrate,Micronized 134 mg PO DAILY 03/03/15 08/12/18 History





[Fenofibrate]    


 


Ranitidine HCl [Zantac] 300 mg PO HS 17 History


 


Tamsulosin HCl [Flomax] 0.8 mg PO HS 17 History


 


Aspirin [Adult Low Dose Aspirin EC] 81 mg PO QAM 18 History


 


Melatonin 3 mg PO HS PRN 18 History


 


traZODone HCL [Desyrel] 200 mg PO HS 18 History


 


Baclofen 5 mg PO TID PRN #1 18 Rx


 


Naproxen [Naprosyn] 250 mg PO BID PRN #20 tab 18 Rx


 


Amitriptyline HCl [Elavil] 25 mg PO HS 18 History


 


Carvedilol [Coreg] 25 mg PO BID 18 History


 


Fluticasone Nasal Spray [Flonase 1 spray EA NOSTRIL DAILY 18 

History





Nasal Spray]    


 


Furosemide [Lasix] 10 mg PO DAILY 18 History


 


LORazepam [Ativan] 0.5 mg PO Q6H PRN 18 History











 Allergies











Allergy/AdvReac Type Severity Reaction Status Date / Time


 


atorvastatin [From Lipitor] Allergy  Unknown Verified 18 16:04


 


codeine AdvReac  Nausea & Verified 18 16:04





   Vomiting  


 


gabapentin AdvReac  Hallucinati Verified 18 16:04





   ons  














Physical Exam


Vitals: 


 Vital Signs











  Temp Pulse Pulse Resp BP BP Pulse Ox


 


 18 04:15  97.7 F   57 L  18   142/70  95


 


 18 04:00     18   


 


 18 00:00     18   


 


 18 23:32    57 L  18   138/68  97


 


 18 21:46    62  16   148/76  97


 


 18 20:00     16   


 


 18 19:02  97.6 F   55 L  14   146/74  97


 


 18 18:15  98.5 F  57 L   20  151/88   99


 


 18 17:15   57 L   18  160/82   99


 


 18 16:15   58 L   18  153/82   99


 


 18 15:45   59 L   20  150/71   98


 


 18 15:08  97.8 F  60   18  181/100   97








 Intake and Output











 18





 22:59 06:59 14:59


 


Other:   


 


  Voiding Method Toilet Toilet 


 


  # Voids  2 


 


  Weight 114.1 kg  














- Constitutional


General appearance: no acute distress





- Respiratory


Respiratory: bilateral: CTA





- Cardiovascular


Rhythm: regular


Heart sounds: normal: S1, S2





Results





 18 15:40





 18 15:40


 Cardiac Enzymes











  18 Range/Units





  15:40 15:40 21:34 


 


AST   23   (17-59)  U/L


 


CK-MB (CK-2)  0.9   0.7  (0.0-2.4)  ng/mL


 


Troponin I  <0.012   <0.012  (0.000-0.034)  ng/mL














  18 Range/Units





  03:42 


 


AST   (17-59)  U/L


 


CK-MB (CK-2)  0.7  (0.0-2.4)  ng/mL


 


Troponin I  <0.012  (0.000-0.034)  ng/mL








 Coagulation











  18 Range/Units





  15:40 


 


PT  11.4  (9.0-12.0)  sec


 


APTT  24.4  (22.0-30.0)  sec








 Lipids











  18 Range/Units





  15:40 


 


Triglycerides  103  (<150)  mg/dL


 


Cholesterol  130  (<200)  mg/dL


 


HDL Cholesterol  37 L  (40-60)  mg/dL








 CBC











  18 Range/Units





  15:40 


 


WBC  7.5  (3.8-10.6)  k/uL


 


RBC  4.44  (4.30-5.90)  m/uL


 


Hgb  13.1  (13.0-17.5)  gm/dL


 


Hct  40.6  (39.0-53.0)  %


 


Plt Count  250  (150-450)  k/uL








 Comprehensive Metabolic Panel











  18 Range/Units





  15:40 


 


Sodium  139  (137-145)  mmol/L


 


Potassium  4.0  (3.5-5.1)  mmol/L


 


Chloride  102  ()  mmol/L


 


Carbon Dioxide  31 H  (22-30)  mmol/L


 


BUN  12  (9-20)  mg/dL


 


Creatinine  0.90  (0.66-1.25)  mg/dL


 


Glucose  104 H  (74-99)  mg/dL


 


Calcium  9.0  (8.4-10.2)  mg/dL


 


AST  23  (17-59)  U/L


 


ALT  24  (21-72)  U/L


 


Alkaline Phosphatase  34 L  ()  U/L


 


Total Protein  6.4  (6.3-8.2)  g/dL


 


Albumin  3.6  (3.5-5.0)  g/dL








 Current Medications











Generic Name Dose Route Start Last Admin





  Trade Name Freq  PRN Reason Stop Dose Admin


 


Amitriptyline HCl  25 mg  18 21:00  18 20:44





  Elavil  PO   25 mg





  HS LEOLA   Administration





     





     





     





     


 


Aspirin  325 mg  18 09:00  





  Aspirin  PO   





  DAILY LEOLA   





     





     





     





     


 


Baclofen  5 mg  18 18:13  





  Lioresal  PO   





  TID PRN   





  Spasms   





     





     





     


 


Carvedilol  25 mg  18 21:00  18 20:44





  Coreg  PO   25 mg





  BID LEOLA   Administration





     





     





     





     


 


Famotidine  40 mg  18 21:00  18 20:44





  Pepcid  PO   40 mg





  HS LEOLA   Administration





     





     





     





     


 


Fenofibrate  160 mg  18 09:00  





  Lofibra  PO   





  DAILY LEOLA   





     





     





     





     


 


Fluticasone Propionate  1 spray  18 09:00  





  Flonase Nasal Spray  EA NOSTRIL   





  DAILY LEOLA   





     





     





     





     


 


Furosemide  10 mg  18 09:00  





  Lasix  PO   





  DAILY LEOLA   





     





     





     





     


 


Lorazepam  0.5 mg  18 18:13  





  Ativan  PO   





  Q6H PRN   





  Anxiety   





     





     





     


 


Melatonin  3 mg  18 18:13  18 20:45





  Melatonin  PO   3 mg





  HS PRN   Administration





  sleep   





     





     





     


 


Naproxen  250 mg  18 21:41  18 23:27





  Naprosyn  PO   250 mg





  BID PRN   Administration





  Pain   





     





     





     


 


Nitroglycerin  1 inch  18 00:00  18 05:19





  Nitro-Bid Oint  TOPICAL   Not Given





  Q6HR LEOLA   





     





     





     





     


 


Paroxetine HCl  40 mg  18 09:00  





  Paxil  PO   





  DAILY LEOLA   





     





     





     





     


 


Tamsulosin HCl  0.8 mg  18 21:00  18 20:45





  Flomax  PO   0.8 mg





  HS LEOLA   Administration





     





     





     





     


 


Trazodone HCl  200 mg  18 21:00  18 20:45





  Desyrel  PO   200 mg





  HS LEOLA   Administration





     





     





     





     








 Intake and Output











 18





 22:59 06:59 14:59


 


Other:   


 


  Voiding Method Toilet Toilet 


 


  # Voids  2 


 


  Weight 114.1 kg  








 





 18 15:40 





 18 15:40 











Assessment and Plan


Assessment: 





Assessment


#1 uncontrolled hypertension


#2 atypical chest discomfort secondary to the above


#3 obesity





Plan


#1 add Norvasc to the current medical regimen


#2 the patient was ruled out for acute coronary event


#3 recent heart catheterization in 2018 showed mild nonobstructive CAD


#4 from the cardiac vascular standpoint overview, the patient can be discharged 

home.  I would recommend obtaining a CT study as an outpatient.





Thank you for allowing us participate in his care

## 2018-08-14 NOTE — P.DS
Providers


Date of admission: 


08/12/18 18:07





Expected date of discharge: 08/13/18


Attending physician: 


Edgar Joshi MD





Consults: 





 





08/12/18 18:07


Consult Physician Urgent 


   Consulting Provider: Miguel A Sanchez


   Consult Reason/Comments: Chest pain


   Do you want consulting provider notified?: Yes











Primary care physician: 


Michael Bravo





[facility] Course: 





Uncontrolled hypertension/hypertensive urgency on admission


Atypical chest pain/pressure likely due to uncontrolled hypertension


Morbid obesity with BMI 41.9


COPD/asthma


History of CVA/TIA with no residual weakness


GERD


Hyperlipidemia


Osteoarthritis


BPH


Chronic low back pain and bilateral sciatica


Anxiety/depression


Previous history of smoking





Hospital course


Patient was continued on home blood pressure medications and Norvasc was added 

as per cardiology recommendations.  ACS ruled out with serial EKGs and 

troponins are negative.  Recent cardiac catheterization in February 2018 showed 

mild nonobstructive coronary disease.  Continued with telemetry monitoring.  

The patient is asymptomatic currently.  Stable to be discharged home and follow 

up with primary care physician cardiology as an outpatient.  Blood pressure is 

better controlled at this time.





Discharge physical examination was done and vitals reviewed.


Patient Condition at Discharge: Good





Plan - Discharge Summary


Discharge Rx Participant: No


New Discharge Prescriptions: 


New


   amLODIPine [Norvasc] 5 mg PO DAILY #30 tab





Continue


   PARoxetine [Paxil] 40 mg PO DAILY


   Fenofibrate,Micronized [Fenofibrate] 134 mg PO DAILY


   Ranitidine HCl [Zantac] 300 mg PO HS


   Tamsulosin HCl [Flomax] 0.8 mg PO HS


   Aspirin [Adult Low Dose Aspirin EC] 81 mg PO QAM


   Melatonin 3 mg PO HS PRN


     PRN Reason: sleep


   traZODone HCL [Desyrel] 200 mg PO HS


   Naproxen [Naprosyn] 250 mg PO BID PRN #20 tab


     PRN Reason: Pain


   Baclofen 5 mg PO TID PRN #1


     PRN Reason: Spasms


   LORazepam [Ativan] 0.5 mg PO Q6H PRN


     PRN Reason: Anxiety


   Fluticasone Nasal Spray [Flonase Nasal Spray] 1 spray EA NOSTRIL DAILY


   Furosemide [Lasix] 10 mg PO DAILY


   Carvedilol [Coreg] 25 mg PO BID


   Amitriptyline HCl [Elavil] 25 mg PO HS


Discharge Medication List





PARoxetine [Paxil] 40 mg PO DAILY 03/02/15 [History]


Fenofibrate,Micronized [Fenofibrate] 134 mg PO DAILY 03/03/15 [History]


Ranitidine HCl [Zantac] 300 mg PO HS 11/16/17 [History]


Tamsulosin HCl [Flomax] 0.8 mg PO HS 11/16/17 [History]


Aspirin [Adult Low Dose Aspirin EC] 81 mg PO QAM 01/25/18 [History]


Melatonin 3 mg PO HS PRN 01/25/18 [History]


traZODone HCL [Desyrel] 200 mg PO HS 05/31/18 [History]


Baclofen 5 mg PO TID PRN #1 06/02/18 [Rx]


Naproxen [Naprosyn] 250 mg PO BID PRN #20 tab 06/02/18 [Rx]


Amitriptyline HCl [Elavil] 25 mg PO HS 08/12/18 [History]


Carvedilol [Coreg] 25 mg PO BID 08/12/18 [History]


Fluticasone Nasal Spray [Flonase Nasal Spray] 1 spray EA NOSTRIL DAILY 08/12/18 

[History]


Furosemide [Lasix] 10 mg PO DAILY 08/12/18 [History]


LORazepam [Ativan] 0.5 mg PO Q6H PRN 08/12/18 [History]


amLODIPine [Norvasc] 5 mg PO DAILY #30 tab 08/13/18 [Rx]








Follow up Appointment(s)/Referral(s): 


Michael Bravo MD [Primary Care Provider] - 3 Days


Usha Denson MD [STAFF PHYSICIAN] - 1 Week


Ambulatory/Diagnostic Orders: 


Complete Blood Count w/diff [LAB.AMB] Time Frame: 3 Days, Location: None 

Selected


Patient Instructions/Handouts:  Chest Pain (GEN), Hypertension (DC)


Activity/Diet/Wound Care/Special Instructions: 


Diet:  Low Sodium


Discharge Disposition: HOME SELF-CARE

## 2018-08-14 NOTE — P.HPIM
History of Present Illness


H&P Date: 18


Chief Complaint: Chest pain








Patient is a 68-year-old male with a known history of hypertension, 

hyperlipidemia, GERD, CVA/TIA, COPD and multiple other medical problems with 

recent cardiac catheterization in 2018 showed mild nonobstructive 

coronary artery disease came to ER with complaints of chest pain and 

uncontrolled hypertension along with headache and shortness of breath.Patient 

states his been intermittent for about a week he was seen and worked up at 

[facility] but they didn't find anything.  Patient states the pains have 

continued and worsened so he decided come to this emergency department.  No 

dizziness or lightheadedness.  No syncope.  The chest discomfort is atypical 

and he described it as a sharp kind of discomfort lasted only for a few 

seconds.  Its in the middle of the chest.  And no radiation. Patient denies any 

fever chills or cough.  Patient denies abdominal pain patient denies nausea 

vomiting diarrhea.  Patient denies lightheadedness but states he is dizzy when 

he walks and has been that way for one week.  Patient states had multiple CAT 

scans at [facility] they did not find anything.  Patient states he 

thinks his dizziness is worse when his blood pressure is high and went over 200 

lately. 





The EKG showed sinus rhythm without any significant ST or T-wave abnormalities.

  Troponin 3 negative..  The d-dimer slightly elevated but the computed 

tomography scan of the chest did not show any evidence of PE.  SBP greater than 

200 on admission.  











Review of Systems





Constitutional: Patient denies any fever or chills .  No generalized weakness 

or weight loss.  


Abdomen: Patient denied nausea vomiting and diarrhea and abdominal pain.


Cardiovascular: Patient does have chest tightness along with shortness of 

breath.  No leg swelling.  No palpitations..


Respiratory: patient denied any cough is from production.  No shortness of 

breath


Neurologic: Patient denied any numbness or tingling headache.


Musculoskeletal: Patient denies any complaints of joint swelling or deformity.


Skin: Negative


Psychiatric: Negative


Endocrine: No heat or cold intolerance.  No recent weight gain.


Genitourinary: No dysuria or hematuria.


All other 14 point ROS negative except the above





Past Medical History


Past Medical History: Asthma, COPD, CVA/TIA, GERD/Reflux, Hyperlipidemia, 

Osteoarthritis (OA), Prostate Disorder, Skin Disorder


Additional Past Medical History / Comment(s): Recent bilateral leg swelling 

which pt states is d/t neurontin, restless Leg Syndrome, low back pain and 

bilateral sciatica, hiatal hernia, stomach ulcer, TIA 8 years ago, vertigo, 

past  concussion from football injury.


History of Any Multi-Drug Resistant Organisms: None Reported


Past Surgical History: Appendectomy, Cholecystectomy, Heart Catheterization, 

Tonsillectomy


Additional Past Surgical History / Comment(s): Cardiac cath 18 normal, cyst 

removed from right hand, colonoscopy/polypectomy


Past Anesthesia/Blood Transfusion Reactions: Motion Sickness


Additional Past Anesthesia/Blood Transfusion Reaction / Comment(s): 

claustrophobia


Past Psychological History: Anxiety, Depression


Additional Psychological History / Comment(s): Pt resides alone.  He states he 

has significant depression d/t spouses death fall of . Pt uses no assistive 

device.  He drives.  Pt states his ronna was managing his medications.


Smoking Status: Former smoker


Past Alcohol Use History: None Reported


Additional Past Alcohol Use History / Comment(s): Started smoking at age 13, 

smoked 1 1/2 ppd and quit 


Past Drug Use History: None Reported





- Past Family History


  ** Mother


Family Medical History: Diabetes Mellitus


Additional Family Medical History / Comment(s): Mother  at the age of 86yrs.





  ** Father


Family Medical History: Cancer


Additional Family Medical History / Comment(s): Throat cancer and  at the 

age of 50yrs.





Medications and Allergies


 Home Medications











 Medication  Instructions  Recorded  Confirmed  Type


 


PARoxetine [Paxil] 40 mg PO DAILY 03/02/15 08/12/18 History


 


Fenofibrate,Micronized 134 mg PO DAILY 03/03/15 08/12/18 History





[Fenofibrate]    


 


Ranitidine HCl [Zantac] 300 mg PO HS 17 History


 


Tamsulosin HCl [Flomax] 0.8 mg PO HS 17 History


 


Aspirin [Adult Low Dose Aspirin EC] 81 mg PO QAM 18 History


 


Melatonin 3 mg PO HS PRN 18 History


 


traZODone HCL [Desyrel] 200 mg PO HS 18 History


 


Baclofen 5 mg PO TID PRN #1 18 Rx


 


Naproxen [Naprosyn] 250 mg PO BID PRN #20 tab 18 Rx


 


Amitriptyline HCl [Elavil] 25 mg PO HS 18 History


 


Carvedilol [Coreg] 25 mg PO BID 18 History


 


Fluticasone Nasal Spray [Flonase 1 spray EA NOSTRIL DAILY 18 

History





Nasal Spray]    


 


Furosemide [Lasix] 10 mg PO DAILY 18 History


 


LORazepam [Ativan] 0.5 mg PO Q6H PRN 18 History


 


amLODIPine [Norvasc] 5 mg PO DAILY #30 tab 18  Rx











 Allergies











Allergy/AdvReac Type Severity Reaction Status Date / Time


 


atorvastatin [From Lipitor] Allergy  Unknown Verified 18 16:04


 


codeine AdvReac  Nausea & Verified 18 16:04





   Vomiting  


 


gabapentin AdvReac  Hallucinati Verified 18 16:04





   ons  














Physical Exam


Vitals: 


 Vital Signs











  Temp Pulse Pulse Resp BP BP Pulse Ox


 


 18 08:33        96


 


 18 08:00  97.7 F   59 L  16   168/81  96


 


 18 04:15  97.7 F   57 L  18   142/70  95


 


 18 04:00     18   


 


 18 00:00     18   


 


 18 23:32    57 L  18   138/68  97


 


 18 21:46    62  16   148/76  97


 


 18 20:00     16   


 


 18 19:02  97.6 F   55 L  14   146/74  97


 


 18 18:15  98.5 F  57 L   20  151/88   99


 


 18 17:15   57 L   18  160/82   99


 


 18 16:15   58 L   18  153/82   99


 


 18 15:45   59 L   20  150/71   98


 


 18 15:08  97.8 F  60   18  181/100   97








 Intake and Output











 18





 22:59 06:59 14:59


 


Other:   


 


  Voiding Method Toilet Toilet Toilet


 


  # Voids  2 1


 


  Weight 114.1 kg  














PHYSICAL EXAMINATION: 


Patient is lying in the bed comfortably, no acute distress, awake alert and 

oriented.. 


HEENT: Normocephalic. Neck is supple. Pupils reactive. Nostrils clear. Oral 

cavity is moist. Ears reveal no drainage. 


Neck reveals no JVD, carotid bruits, or thyromegaly. 


CHEST EXAMINATION: Trachea is central. Symmetrical expansion. Lung fields clear 

to auscultation and percussion. 


CARDIAC: Normal S1, S2 with no gallops. No murmurs 


ABDOMEN: Soft. Bowel sounds normal. No organomegaly. No abdominal bruits. 


Extremities: reveal no edema.  No clubbing or cyanosis


Neurologically awake, alert, oriented x3 with well-coordinated movements.  No 

focal deficits noted


Skin: No rash or skin lesions. 


Psychiatric: Coperative.  Nonsuicidal


Musculoskeletal: No joint swelling or deformity.  Normal range of motion.








Results


CBC & Chem 7: 


 18 15:40





 18 15:40


Labs: 


 Abnormal Lab Results - Last 24 Hours (Table)











  18 Range/Units





  15:40 15:40 15:40 


 


INR   1.2 H   (<1.2)  


 


D-Dimer   1.08 H   (<0.60)  mg/L FEU


 


Carbon Dioxide  31 H    (22-30)  mmol/L


 


Glucose  104 H    (74-99)  mg/dL


 


Alkaline Phosphatase  34 L    ()  U/L


 


Total Creatine Kinase     ()  U/L


 


HDL Cholesterol    37 L  (40-60)  mg/dL














  18 Range/Units





  21:34 03:42 


 


INR    (<1.2)  


 


D-Dimer    (<0.60)  mg/L FEU


 


Carbon Dioxide    (22-30)  mmol/L


 


Glucose    (74-99)  mg/dL


 


Alkaline Phosphatase    ()  U/L


 


Total Creatine Kinase  50 L  42 L  ()  U/L


 


HDL Cholesterol    (40-60)  mg/dL














Thrombosis Risk Factor Assmnt





- DVT/VTE Prophylaxis


DVT/VTE Prophylaxis: Pharmacologic Prophylaxis ordered





- Choose All That Apply


Any of the Below Risk Factors Present?: Yes


Each Factor Represents 1 point: Obesity (BMI >25)


Other Risk Factors: Yes


Each Risk Factor Represents 2 Points: Age 61-74 years


Thrombosis Risk Factor Assessment Total Risk Factor Score: 3


Thrombosis Risk Factor Assessment Level: Moderate Risk





Assessment and Plan


Assessment: 





Uncontrolled hypertension/hypertensive urgency on admission


Atypical chest pain/pressure likely due to uncontrolled hypertension


Morbid obesity with BMI 41.9


COPD/asthma


History of CVA/TIA with no residual weakness


GERD


Hyperlipidemia


Osteoarthritis


BPH


Chronic low back pain and bilateral sciatica


Anxiety/depression


Previous history of smoking





Plan:


Patient will be continued on blood pressure medications and Norvasc was added 

as per cardiology recommendations.  ACS ruled out with serial EKGs and 

troponins are negative.  Recent cardiac catheterization in 2018 showed 

mild nonobstructive coronary disease.  Continue with telemetry monitoring.  

Cardiology has been consulted and further recommendations based on the clinical 

course.





Time with Patient: Greater than 30

## 2019-02-15 ENCOUNTER — HOSPITAL ENCOUNTER (OUTPATIENT)
Dept: HOSPITAL 47 - EC | Age: 69
Setting detail: OBSERVATION
LOS: 1 days | Discharge: HOME | End: 2019-02-16
Attending: HOSPITALIST | Admitting: HOSPITALIST
Payer: MEDICARE

## 2019-02-15 DIAGNOSIS — Z88.8: ICD-10-CM

## 2019-02-15 DIAGNOSIS — R09.89: ICD-10-CM

## 2019-02-15 DIAGNOSIS — I10: ICD-10-CM

## 2019-02-15 DIAGNOSIS — R60.0: ICD-10-CM

## 2019-02-15 DIAGNOSIS — R79.89: ICD-10-CM

## 2019-02-15 DIAGNOSIS — Z83.3: ICD-10-CM

## 2019-02-15 DIAGNOSIS — M19.90: ICD-10-CM

## 2019-02-15 DIAGNOSIS — Z87.891: ICD-10-CM

## 2019-02-15 DIAGNOSIS — Z87.11: ICD-10-CM

## 2019-02-15 DIAGNOSIS — G89.29: ICD-10-CM

## 2019-02-15 DIAGNOSIS — Z80.8: ICD-10-CM

## 2019-02-15 DIAGNOSIS — I16.0: ICD-10-CM

## 2019-02-15 DIAGNOSIS — I25.10: ICD-10-CM

## 2019-02-15 DIAGNOSIS — R07.2: Primary | ICD-10-CM

## 2019-02-15 DIAGNOSIS — F41.9: ICD-10-CM

## 2019-02-15 DIAGNOSIS — R11.0: ICD-10-CM

## 2019-02-15 DIAGNOSIS — Z90.49: ICD-10-CM

## 2019-02-15 DIAGNOSIS — M54.42: ICD-10-CM

## 2019-02-15 DIAGNOSIS — R20.2: ICD-10-CM

## 2019-02-15 DIAGNOSIS — G25.81: ICD-10-CM

## 2019-02-15 DIAGNOSIS — Z88.5: ICD-10-CM

## 2019-02-15 DIAGNOSIS — F32.9: ICD-10-CM

## 2019-02-15 DIAGNOSIS — Z79.82: ICD-10-CM

## 2019-02-15 DIAGNOSIS — M54.41: ICD-10-CM

## 2019-02-15 DIAGNOSIS — J44.9: ICD-10-CM

## 2019-02-15 DIAGNOSIS — E66.9: ICD-10-CM

## 2019-02-15 DIAGNOSIS — E78.5: ICD-10-CM

## 2019-02-15 DIAGNOSIS — Z86.73: ICD-10-CM

## 2019-02-15 DIAGNOSIS — Z79.899: ICD-10-CM

## 2019-02-15 DIAGNOSIS — Z87.820: ICD-10-CM

## 2019-02-15 DIAGNOSIS — N40.0: ICD-10-CM

## 2019-02-15 DIAGNOSIS — K21.9: ICD-10-CM

## 2019-02-15 LAB
ALBUMIN SERPL-MCNC: 3.7 G/DL (ref 3.5–5)
ALP SERPL-CCNC: 45 U/L (ref 38–126)
ALT SERPL-CCNC: 32 U/L (ref 21–72)
AMYLASE SERPL-CCNC: 43 U/L (ref 30–110)
ANION GAP SERPL CALC-SCNC: 7 MMOL/L
APTT BLD: 23.6 SEC (ref 22–30)
AST SERPL-CCNC: 20 U/L (ref 17–59)
BASOPHILS # BLD AUTO: 0 K/UL (ref 0–0.2)
BASOPHILS NFR BLD AUTO: 1 %
BUN SERPL-SCNC: 13 MG/DL (ref 9–20)
CALCIUM SPEC-MCNC: 9.3 MG/DL (ref 8.4–10.2)
CHLORIDE SERPL-SCNC: 100 MMOL/L (ref 98–107)
CK SERPL-CCNC: 46 U/L (ref 55–170)
CK SERPL-CCNC: 51 U/L (ref 55–170)
CK SERPL-CCNC: 59 U/L (ref 55–170)
CO2 SERPL-SCNC: 35 MMOL/L (ref 22–30)
D DIMER PPP FEU-MCNC: 0.94 MG/L FEU (ref ?–0.6)
EOSINOPHIL # BLD AUTO: 0.2 K/UL (ref 0–0.7)
EOSINOPHIL NFR BLD AUTO: 3 %
ERYTHROCYTE [DISTWIDTH] IN BLOOD BY AUTOMATED COUNT: 4.66 M/UL (ref 4.3–5.9)
ERYTHROCYTE [DISTWIDTH] IN BLOOD: 13.8 % (ref 11.5–15.5)
GLUCOSE SERPL-MCNC: 107 MG/DL (ref 74–99)
HCT VFR BLD AUTO: 43.4 % (ref 39–53)
HGB BLD-MCNC: 14 GM/DL (ref 13–17.5)
INR PPP: 1 (ref ?–1.2)
LIPASE SERPL-CCNC: 54 U/L (ref 23–300)
LYMPHOCYTES # SPEC AUTO: 2.1 K/UL (ref 1–4.8)
LYMPHOCYTES NFR SPEC AUTO: 24 %
MAGNESIUM SPEC-SCNC: 2.1 MG/DL (ref 1.6–2.3)
MCH RBC QN AUTO: 30.2 PG (ref 25–35)
MCHC RBC AUTO-ENTMCNC: 32.3 G/DL (ref 31–37)
MCV RBC AUTO: 93.3 FL (ref 80–100)
MONOCYTES # BLD AUTO: 0.8 K/UL (ref 0–1)
MONOCYTES NFR BLD AUTO: 9 %
NEUTROPHILS # BLD AUTO: 5.7 K/UL (ref 1.3–7.7)
NEUTROPHILS NFR BLD AUTO: 63 %
PLATELET # BLD AUTO: 299 K/UL (ref 150–450)
POTASSIUM SERPL-SCNC: 4 MMOL/L (ref 3.5–5.1)
PROT SERPL-MCNC: 6.8 G/DL (ref 6.3–8.2)
PT BLD: 10.9 SEC (ref 9–12)
SODIUM SERPL-SCNC: 142 MMOL/L (ref 137–145)
TROPONIN I SERPL-MCNC: 0.01 NG/ML (ref 0–0.03)
TROPONIN I SERPL-MCNC: <0.012 NG/ML (ref 0–0.03)
TROPONIN I SERPL-MCNC: <0.012 NG/ML (ref 0–0.03)
WBC # BLD AUTO: 9.1 K/UL (ref 3.8–10.6)

## 2019-02-15 PROCEDURE — 80048 BASIC METABOLIC PNL TOTAL CA: CPT

## 2019-02-15 PROCEDURE — 71045 X-RAY EXAM CHEST 1 VIEW: CPT

## 2019-02-15 PROCEDURE — 80053 COMPREHEN METABOLIC PANEL: CPT

## 2019-02-15 PROCEDURE — 84484 ASSAY OF TROPONIN QUANT: CPT

## 2019-02-15 PROCEDURE — 71275 CT ANGIOGRAPHY CHEST: CPT

## 2019-02-15 PROCEDURE — 82150 ASSAY OF AMYLASE: CPT

## 2019-02-15 PROCEDURE — 94760 N-INVAS EAR/PLS OXIMETRY 1: CPT

## 2019-02-15 PROCEDURE — 36415 COLL VENOUS BLD VENIPUNCTURE: CPT

## 2019-02-15 PROCEDURE — 85730 THROMBOPLASTIN TIME PARTIAL: CPT

## 2019-02-15 PROCEDURE — 83735 ASSAY OF MAGNESIUM: CPT

## 2019-02-15 PROCEDURE — 83690 ASSAY OF LIPASE: CPT

## 2019-02-15 PROCEDURE — 82550 ASSAY OF CK (CPK): CPT

## 2019-02-15 PROCEDURE — 80061 LIPID PANEL: CPT

## 2019-02-15 PROCEDURE — 93005 ELECTROCARDIOGRAM TRACING: CPT

## 2019-02-15 PROCEDURE — 85025 COMPLETE CBC W/AUTO DIFF WBC: CPT

## 2019-02-15 PROCEDURE — 96374 THER/PROPH/DIAG INJ IV PUSH: CPT

## 2019-02-15 PROCEDURE — 96372 THER/PROPH/DIAG INJ SC/IM: CPT

## 2019-02-15 PROCEDURE — 82553 CREATINE MB FRACTION: CPT

## 2019-02-15 PROCEDURE — 85379 FIBRIN DEGRADATION QUANT: CPT

## 2019-02-15 PROCEDURE — 85610 PROTHROMBIN TIME: CPT

## 2019-02-15 PROCEDURE — 99285 EMERGENCY DEPT VISIT HI MDM: CPT

## 2019-02-15 RX ADMIN — FENOFIBRATE SCH MG: 160 TABLET ORAL at 12:18

## 2019-02-15 RX ADMIN — CARVEDILOL SCH MG: 12.5 TABLET, FILM COATED ORAL at 16:52

## 2019-02-15 RX ADMIN — HEPARIN SODIUM SCH UNIT: 5000 INJECTION, SOLUTION INTRAVENOUS; SUBCUTANEOUS at 20:16

## 2019-02-15 RX ADMIN — CARVEDILOL SCH MG: 12.5 TABLET, FILM COATED ORAL at 12:18

## 2019-02-15 NOTE — XR
EXAM:

  XR Chest, 1 View

 

CLINICAL HISTORY:

  ITS.REASON XR Reason: chest pain

 

TECHNIQUE:

  Frontal view of the chest.

 

COMPARISON:

  Chest radiograph on 8/12/2018

 

FINDINGS:

  Hardware: None.

 

  Lungs/pleura: Question pulmonary vasculature congestion. No focal 

consolidation. No pleural effusion or pneumothorax.

 

  Heart/mediastinum: Stable mild enlargement of the cardiac silhouette.

 

  Soft tissues: Unremarkable.

 

  Bones: No acute fracture.  Degenerative changes of the spine.

 

  Upper abdomen: Normal.

 

IMPRESSION:   

  Question pulmonary vasculature congestion.  No focal consolidation.

## 2019-02-15 NOTE — ED
Chest Pain HPI





- General


Chief Complaint: Chest Pain


Stated Complaint: Chest Pain


Time Seen by Provider: 02/15/19 05:06


Source: EMS


Mode of arrival: EMS


Limitations: no limitations





- History of Present Illness


Initial Comments: 





This patient is 68-year-old man complaining of chest pain that is intermittent, 

sharp, moderate severity.  He states the pains have been going on 

intermittently for weeks.  Patient was concerned because he had trouble some 

snow yesterday and now the pain seems to be a bit more prominent.


MD Complaint: chest pain


-: week(s)


Onset: during rest


Pain Location: substernal


Pain Radiation: none


Severity: moderate


Quality: sharp


Consistency: intermittent


Improves With: nothing


Worsens With: nothing


Treatments Prior to Arrival: none





- Related Data


 Home Medications











 Medication  Instructions  Recorded  Confirmed


 


Fenofibrate,Micronized 134 mg PO HS 03/03/15 02/15/19





[Fenofibrate]   


 


Ranitidine HCl [Zantac] 300 mg PO HS 11/16/17 02/15/19


 


Tamsulosin HCl [Flomax] 0.8 mg PO HS 11/16/17 02/15/19


 


Aspirin [Adult Low Dose Aspirin EC] 81 mg PO QAM 01/25/18 02/15/19


 


Melatonin 3 mg PO HS PRN 01/25/18 02/15/19


 


traZODone HCL [Desyrel] 200 mg PO HS 05/31/18 02/15/19


 


Amitriptyline HCl [Elavil] 25 mg PO HS 08/12/18 02/15/19


 


Carvedilol [Coreg] 25 mg PO BID 08/12/18 02/15/19


 


Fluticasone Nasal Spray [Flonase 1 spray EA NOSTRIL DAILY 08/12/18 02/15/19





Nasal Spray]   


 


LORazepam [Ativan] 0.5 mg PO Q6H PRN 08/12/18 02/15/19


 


PARoxetine HCL [Paxil] 40 mg PO DAILY 02/15/19 02/15/19


 


amLODIPine [Norvasc] 5 mg PO HS 02/15/19 02/15/19








 Previous Rx's











 Medication  Instructions  Recorded


 


Baclofen 5 mg PO TID PRN #1 18


 


Nitroglycerin Sl Tabs [Nitrostat] 0.4 mg SUBLINGUAL Q5M PRN #20 tab 19


 


hydrALAZINE HCL [Apresoline] 25 mg PO BID #60 tab 19











 Allergies











Allergy/AdvReac Type Severity Reaction Status Date / Time


 


atorvastatin [From Lipitor] Allergy  Unknown Verified 02/15/19 08:18


 


codeine AdvReac  Nausea & Verified 02/15/19 08:18





   Vomiting  


 


gabapentin AdvReac  Hallucinati Verified 02/15/19 08:18





   ons  














Review of Systems


ROS Statement: 


Those systems with pertinent positive or pertinent negative responses have been 

documented in the HPI.





ROS Other: All systems not noted in ROS Statement are negative.


Constitutional: Denies: fever, chills


ENT: Denies: throat pain


Respiratory: Denies: cough, dyspnea


Cardiovascular: Reports: chest pain, orthopnea.  Denies: palpitations, syncope


Gastrointestinal: Denies: abdominal pain, nausea, vomiting


Genitourinary: Denies: dysuria, hematuria


Musculoskeletal: Denies: back pain


Skin: Denies: rash


Neurological: Denies: headache, weakness, numbness





EKG Findings





- EKG Results:


EKG: interpreted by NAJMA VERA, sinus rhythm (Rate 67 bpm), normal axis, normal 

QRS, normal ST/T, no acute changes





Past Medical History


Past Medical History: Asthma, COPD, CVA/TIA, GERD/Reflux, Hyperlipidemia, 

Osteoarthritis (OA), Prostate Disorder, Skin Disorder


Additional Past Medical History / Comment(s): Recent bilateral leg swelling 

which pt states is d/t neurontin, restless Leg Syndrome, low back pain and 

bilateral sciatica, hiatal hernia, stomach ulcer, TIA 8 years ago, vertigo, 

past  concussion from football injury.


History of Any Multi-Drug Resistant Organisms: None Reported


Past Surgical History: Appendectomy, Cholecystectomy, Heart Catheterization, 

Tonsillectomy


Additional Past Surgical History / Comment(s): Cardiac cath 18 normal, cyst 

removed from right hand, colonoscopy/polypectomy


Past Anesthesia/Blood Transfusion Reactions: Motion Sickness


Additional Past Anesthesia/Blood Transfusion Reaction / Comment(s): 

claustrophobia


Past Psychological History: Anxiety, Depression


Smoking Status: Former smoker


Past Alcohol Use History: None Reported


Past Drug Use History: None Reported





- Past Family History


  ** Mother


Family Medical History: Diabetes Mellitus


Additional Family Medical History / Comment(s): Mother  at the age of 86yrs.





  ** Father


Family Medical History: Cancer


Additional Family Medical History / Comment(s): Throat cancer and  at the 

age of 50yrs.





General Exam


Limitations: no limitations


General appearance: alert, in no apparent distress, obese


Head exam: Present: atraumatic, normocephalic


Eye exam: Present: normal appearance.  Absent: scleral icterus, conjunctival 

injection


Neck exam: Present: normal inspection, full ROM


Respiratory exam: Present: normal lung sounds bilaterally.  Absent: respiratory 

distress, wheezes, rales, rhonchi, stridor


Cardiovascular Exam: Present: regular rate, normal rhythm, normal heart sounds.

  Absent: systolic murmur, diastolic murmur, rubs, gallop


GI/Abdominal exam: Present: soft.  Absent: distended, tenderness, guarding, 

rebound, mass


Extremities exam: Present: normal inspection, normal capillary refill.  Absent: 

pedal edema, calf tenderness


Back exam: Present: normal inspection.  Absent: CVA tenderness (R), CVA 

tenderness (L)


Neurological exam: Present: alert


Skin exam: Present: warm, dry, intact, normal color.  Absent: rash





Course


 Vital Signs











  02/15/19 02/15/19 02/15/19





  04:41 04:58 05:44


 


Temperature 98.3 F  98.1 F


 


Pulse Rate 68  71


 


Respiratory 20 20 20





Rate   


 


Blood Pressure 178/82  168/84


 


O2 Sat by Pulse 94 L  98





Oximetry   














  02/15/19 02/15/19





  07:00 07:58


 


Temperature 98.4 F 


 


Pulse Rate 62 63


 


Respiratory 18 18





Rate  


 


Blood Pressure 159/82 180/85


 


O2 Sat by Pulse 99 95





Oximetry  














Chest Pain MDM





- MDM





Shouldn't is 68-year-old man presenting with chest pain that has some typical 

but atypical features.  Will be admitted for telemetry monitoring and serial 

cardiac enzymes.





Disposition


Clinical Impression: 


 Chest pain





Disposition: ADMITTED AS IP TO THIS HOSP


Condition: Fair


Is patient prescribed a controlled substance at d/c from ED?: No

## 2019-02-15 NOTE — P.HPIM
History of Present Illness





This is a pleasant 68 years old male with past medical history of asthma/COPD, 

hyperlipidemia, hypertension, TIA, osteoarthritis, bilateral leg edema, 

restless leg syndrome, chronic low back pain and bilateral sciatica


Who presents because of chest pain, patient woke up 3 am in the morning with 

filling of needles in the left upper chest, nonradiating , nonspecific in 

severity, he had similar episodes about 6 months ago and 2 years ago and have 

been told this is from gas.  No associated nausea vomiting, no sweating, no 

dyspnea.  However patient states that when he shelved the snow 2 days ago he 

felt short of breath.


On admission patient was hypertensive.  He has elevated d-dimer with negative 

CTPA for pulmonary embolism.  Chest x-ray: Possible vascular congestion.  

Patient has been evaluated by cardiologist and recommended to keep patient for 

monitoring.























Review of Systems





CONSTITUTIONAL: No fever, no malaise, no fatigue. 


HEENT: No recent visual problems or hearing problems. Denied any sore throat. 


CARDIOVASCULAR: No  orthopnea, PND, no palpitations, no syncope. 


PULMONARY: No shortness of breath, no cough, no hemoptysis. 


GASTROINTESTINAL: No diarrhea, no nausea, no vomiting, no abdominal pain. 

Normoactive bowel sounds. 


NEUROLOGICAL: No headaches, no weakness, no numbness. 


HEMATOLOGICAL: Denies any bleeding or petechiae. 


GENITOURINARY: Denies any burning micturition, frequency, or urgency. 


MUSCULOSKELETAL/RHEUMATOLOGICAL: Denies any joint pain, swelling, or any muscle 

pain. 


ENDOCRINE: Denies any polyuria or polydipsia.











Past Medical History


Past Medical History: Asthma, COPD, CVA/TIA, GERD/Reflux, Hyperlipidemia, 

Hypertension, Osteoarthritis (OA), Prostate Disorder, Skin Disorder


Additional Past Medical History / Comment(s): Recent bilateral leg swelling 

which pt states is d/t neurontin, RLS, low back pain and bilateral sciatica, 

hiatal hernia, stomach ulcer, TIA 8 years ago, vertigo, BPH, past  concussion 

from football injury.


History of Any Multi-Drug Resistant Organisms: None Reported


Past Surgical History: Appendectomy, Cholecystectomy, Heart Catheterization, 

Tonsillectomy


Additional Past Surgical History / Comment(s): Epidural injection 18, 

cardiac cath 18 normal, cyst removed from right hand, colonoscopy/

polypectomy


Past Anesthesia/Blood Transfusion Reactions: Motion Sickness


Additional Past Anesthesia/Blood Transfusion Reaction / Comment(s): 

claustrophobia


Smoking Status: Former smoker





- Past Family History


  ** Mother


Family Medical History: Diabetes Mellitus


Additional Family Medical History / Comment(s): Mother  at the age of 86yrs.





  ** Father


Family Medical History: Cancer


Additional Family Medical History / Comment(s): Throat cancer and  at the 

age of 50yrs.





Medications and Allergies


 Home Medications











 Medication  Instructions  Recorded  Confirmed  Type


 


Fenofibrate,Micronized 134 mg PO HS 03/03/15 02/15/19 History





[Fenofibrate]    


 


Ranitidine HCl [Zantac] 300 mg PO HS 11/16/17 02/15/19 History


 


Tamsulosin HCl [Flomax] 0.8 mg PO HS 11/16/17 02/15/19 History


 


Aspirin [Adult Low Dose Aspirin EC] 81 mg PO QAM 01/25/18 02/15/19 History


 


Melatonin 3 mg PO HS PRN 01/25/18 02/15/19 History


 


traZODone HCL [Desyrel] 200 mg PO HS 05/31/18 02/15/19 History


 


Baclofen 5 mg PO TID PRN #1 06/02/18 02/15/19 Rx


 


Naproxen [Naprosyn] 250 mg PO BID PRN #20 tab 06/02/18 02/15/19 Rx


 


Amitriptyline HCl [Elavil] 25 mg PO HS 08/12/18 02/15/19 History


 


Carvedilol [Coreg] 25 mg PO BID 08/12/18 02/15/19 History


 


Fluticasone Nasal Spray [Flonase 1 spray EA NOSTRIL DAILY 08/12/18 02/15/19 

History





Nasal Spray]    


 


LORazepam [Ativan] 0.5 mg PO Q6H PRN 08/12/18 02/15/19 History


 


PARoxetine HCL [Paxil] 40 mg PO DAILY 02/15/19 02/15/19 History


 


amLODIPine [Norvasc] 5 mg PO HS 02/15/19 02/15/19 History











 Allergies











Allergy/AdvReac Type Severity Reaction Status Date / Time


 


atorvastatin [From Lipitor] Allergy  Unknown Verified 02/15/19 08:18


 


codeine AdvReac  Nausea & Verified 02/15/19 08:18





   Vomiting  


 


gabapentin AdvReac  Hallucinati Verified 02/15/19 08:18





   ons  














Physical Exam


Vitals: 


 Vital Signs











  Temp Pulse Pulse Pulse Resp BP BP


 


 02/15/19 15:48  97.9 F   61   18   189/99


 


 02/15/19 12:00  97.3 F L    72  18  


 


 02/15/19 09:37       


 


 02/15/19 08:45  97.4 F L    74  18  


 


 02/15/19 07:58   63    18  180/85 


 


 02/15/19 07:00  98.4 F  62    18  159/82 


 


 02/15/19 05:44  98.1 F  71    20  168/84 


 


 02/15/19 04:58      20  


 


 02/15/19 04:41  98.3 F  68    20  178/82 














  BP Pulse Ox


 


 02/15/19 15:48   96


 


 02/15/19 12:00  183/92  96


 


 02/15/19 09:37   94 L


 


 02/15/19 08:45  184/68  94 L


 


 02/15/19 07:58   95


 


 02/15/19 07:00   99


 


 02/15/19 05:44   98


 


 02/15/19 04:58  


 


 02/15/19 04:41   94 L








 Intake and Output











 02/15/19 02/15/19 02/15/19





 06:59 14:59 22:59


 


Intake Total  458 


 


Balance  458 


 


Intake:   


 


  Oral  458 


 


Other:   


 


  # Voids   1


 


  Weight 129.274 kg  














GENERAL: The patient is alert and oriented x3, not in any acute distress. Well 

developed, well nourished. 


HEENT: Pupils are round and equally reacting to light. EOMI. No scleral 

icterus. No conjunctival pallor. Normocephalic, atraumatic. No pharyngeal 

erythema. No thyromegaly. 


CARDIOVASCULAR: S1 and S2 present. No murmurs, rubs, or gallops. 


PULMONARY: Chest is clear to auscultation, no wheezing or crackles. 


ABDOMEN: Soft, nontender, nondistended, normoactive bowel sounds. No palpable 

organomegaly. 


MUSCULOSKELETAL: No joint swelling or deformity. 


EXTREMITIES: No cyanosis, clubbing, or pedal edema. 


NEUROLOGICAL: Gross neurological examination did not reveal any focal deficits. 


SKIN: No rashes.











Results


CBC & Chem 7: 


 02/15/19 04:15





 02/15/19 04:15


Labs: 


 Abnormal Lab Results - Last 24 Hours (Table)











  02/15/19 02/15/19 02/15/19 Range/Units





  04:15 04:15 04:15 


 


D-Dimer    0.94 H  (<0.60)  mg/L FEU


 


Carbon Dioxide  35 H    (22-30)  mmol/L


 


Glucose  107 H    (74-99)  mg/dL


 


Total Creatine Kinase   51 L   ()  U/L














  02/15/19 Range/Units





  10:37 


 


D-Dimer   (<0.60)  mg/L FEU


 


Carbon Dioxide   (22-30)  mmol/L


 


Glucose   (74-99)  mg/dL


 


Total Creatine Kinase  46 L  ()  U/L














Thrombosis Risk Factor Assmnt





- Choose All That Apply


Any of the Below Risk Factors Present?: Yes


Each Factor Represents 1 point: Abnormal pulmonary function (COPD), Obesity (

BMI >25)


Other Risk Factors: Yes


Each Risk Factor Represents 2 Points: Age 61-74 years


Other congenital or acquired thrombophilia - If yes, enter type in comment: No


Thrombosis Risk Factor Assessment Total Risk Factor Score: 4


Thrombosis Risk Factor Assessment Level: Moderate Risk





Assessment and Plan


Assessment: 





Chest pain, rule out cardiac causes.


Hypertensive urgency


Pulmonary congestion on chest x-ray


History of COPD, not in acute exacerbation


Hyperlipidemia


Hypertension


History of TIA


Surface arthritis


The lateral leg edema


Restless leg syndrome


Plan: 





This is a pleasant 68 years old male who presents because of chest pain.  We'll 

do serial troponins.  EKG.  Cardiology consult.  Continue with aspirin we will 

add 1 dose of Lasix and add hydralazine for high blood pressure.


Labs and medication were reviewed..  Continue same treatment.  Continue with 

symptomatic treatment.  Resume home medication.  Monitor lytes and vitals.  DVT 

and GI prophylaxis.  Further recommendations of the clinical course of the 

patient


DVT prophylaxis: Subcutaneous heparin


GI Prophylaxis: Pepcid


Prognosis is guarded

## 2019-02-15 NOTE — P.CRDCN
History of Present Illness


Consult date: 02/15/19


Consult reason: chest pain


Chief complaint: Sharp internittent chest pain.


History of present illness: 


This is a 68-year-old male. Presents this date complaining of chest pain that 

is intermittent, sharp, causing nausea and shortness of breath. Pain is at left 

chest sternal border and left shoulder. His chest pain discomfort is worse with 

exertion. Patient states chest pains have been going on intermittently for 

weeks.  Patient states he was shoveling snow yesterday and the pain seems to be 

more consistent and prominent. Patient also states he has, "pressure of the 

head on the left side."  Pt states it is no a headache but, "pressure." Patient 

is supposed to visit with his cardiologist on Wednesday in [place]. Patient has 

catheterization history from 2018 showing mild non-obstructive CAD. 

Patient also says he has a history of GERD.  





EKG shows SR, rate of 67 beats per minute.


 


Troponins are normal x 1.





Chest x-ray showes borderline pulmonary edema. Elevated D-dimer. CTA of chest 

negative for PE or negative for Thoracic aortic dissection.





No recent echo. 


 








Review of Systems


At the time of my exam:





CONSTITUTIONAL: Denies fever. Denies chills.


EYES: Denies blurred vision. Denies vision changes. Denies eye pain.


EARS, NOSE, MOUTH & THROAT: Denies headache but states he had "pressure" on the 

LEFT side of his head. Denies sore throat. Denies ear pain.


CARDIOVASCULAR: Reports LEFT sternal border chest pain/LEFT shoulder. Reports 

shortness of breath with exertion. Denies orthopnea. Denies PND. Denies 

palpitations.


RESPIRATORY: Denies cough. 


GASTROINTESTINAL: Denies abdominal pain. Denies diarrhea. Denies constipation. 

Denies nausea. Denies vomiting.


MUSCULOSKELETAL: Reports myalgias of LEFT shoulder.


INTEGUMENTARY: Denies pruitis. Denies rash.


NEUROLOGIC: Denies numbness. Denies tingling. Denies weakness.


PSYCHIATRIC: Denies anxiety. Denies depression.


ENDOCRINE: Denies fatigue. Denies weight change. Denies polydipsia. Denies 

polyurina.


GENITOURINARY: Denies burning, hematuria or urgency with micturation.


HEMATOLOGIC: Denies history of anemia. Denies bleeding. 








Past Medical History


Past Medical History: Asthma, COPD, CVA/TIA, GERD/Reflux, Hyperlipidemia, 

Hypertension, Osteoarthritis (OA), Prostate Disorder, Skin Disorder


Additional Past Medical History / Comment(s): Recent bilateral leg swelling 

which pt states is d/t neurontin, RLS, low back pain and bilateral sciatica, 

hiatal hernia, stomach ulcer, TIA 8 years ago, vertigo, BPH, past  concussion 

from football injury.


History of Any Multi-Drug Resistant Organisms: None Reported


Past Surgical History: Appendectomy, Cholecystectomy, Heart Catheterization, 

Tonsillectomy


Additional Past Surgical History / Comment(s): Epidural injection 18, 

cardiac cath 18 normal, cyst removed from right hand, colonoscopy/

polypectomy


Past Anesthesia/Blood Transfusion Reactions: Motion Sickness


Additional Past Anesthesia/Blood Transfusion Reaction / Comment(s): 

claustrophobia


Smoking Status: Former smoker





- Past Family History


  ** Mother


Family Medical History: Diabetes Mellitus


Additional Family Medical History / Comment(s): Mother  at the age of 86yrs.





  ** Father


Family Medical History: Cancer


Additional Family Medical History / Comment(s): Throat cancer and  at the 

age of 50yrs.





Medications and Allergies


 Home Medications











 Medication  Instructions  Recorded  Confirmed  Type


 


Fenofibrate,Micronized 134 mg PO HS 03/03/15 02/15/19 History





[Fenofibrate]    


 


Ranitidine HCl [Zantac] 300 mg PO HS 11/16/17 02/15/19 History


 


Tamsulosin HCl [Flomax] 0.8 mg PO HS 11/16/17 02/15/19 History


 


Aspirin [Adult Low Dose Aspirin EC] 81 mg PO QAM 01/25/18 02/15/19 History


 


Melatonin 3 mg PO HS PRN 01/25/18 02/15/19 History


 


traZODone HCL [Desyrel] 200 mg PO HS 05/31/18 02/15/19 History


 


Baclofen 5 mg PO TID PRN #1 06/02/18 02/15/19 Rx


 


Naproxen [Naprosyn] 250 mg PO BID PRN #20 tab 06/02/18 02/15/19 Rx


 


Amitriptyline HCl [Elavil] 25 mg PO HS 08/12/18 02/15/19 History


 


Carvedilol [Coreg] 25 mg PO BID 08/12/18 02/15/19 History


 


Fluticasone Nasal Spray [Flonase 1 spray EA NOSTRIL DAILY 08/12/18 02/15/19 

History





Nasal Spray]    


 


LORazepam [Ativan] 0.5 mg PO Q6H PRN 08/12/18 02/15/19 History


 


PARoxetine HCL [Paxil] 40 mg PO DAILY 02/15/19 02/15/19 History


 


amLODIPine [Norvasc] 5 mg PO HS 02/15/19 02/15/19 History











 Allergies











Allergy/AdvReac Type Severity Reaction Status Date / Time


 


atorvastatin [From Lipitor] Allergy  Unknown Verified 02/15/19 08:18


 


codeine AdvReac  Nausea & Verified 02/15/19 08:18





   Vomiting  


 


gabapentin AdvReac  Hallucinati Verified 02/15/19 08:18





   ons  














Physical Exam


Vitals: 


 Vital Signs











  Temp Pulse Pulse Resp BP BP Pulse Ox


 


 02/15/19 12:00  97.3 F L   72  18   183/92  96


 


 02/15/19 09:37        94 L


 


 02/15/19 08:45  97.4 F L   74  18   184/68  94 L


 


 02/15/19 07:58   63   18  180/85   95


 


 02/15/19 07:00  98.4 F  62   18  159/82   99


 


 02/15/19 05:44  98.1 F  71   20  168/84   98


 


 02/15/19 04:58     20   


 


 02/15/19 04:41  98.3 F  68   20  178/82   94 L








 Intake and Output











 02/14/19 02/15/19 02/15/19





 22:59 06:59 14:59


 


Intake Total   458


 


Balance   458


 


Intake:   


 


  Oral   458


 


Other:   


 


  Weight  129.274 kg 











GENERAL: This is a 68 year old male in mild distress at the time of my 

examination.


HEENT: Head is atraumatic, normocephalic. Pupils are equal, round. Sclerae 

anicteric. Conjunctivae are clear. Mucous membranes of the mouth are moist. 

Neck is supple. There is no jugular venous distention. No carotid bruit is 

heard.


LUNGS: Distant lung sounds to auscultation, no crackles, no wheezes, no rales 

or no rhonchi. No chest wall tenderness is noted on palpation or with deep 

breathing.


HEART: Regular rate and rhythm without murmurs, rubs or gallops. S1 and S2 

heard.


ABDOMEN: Soft, nontender. Bowel sounds are heard. No organomegaly noted.


EXTREMITIES: Evidence of peripheral edema on BLE 2-3+. No calf tenderness noted.


VASCULAR: Radial and dorsalis pedis pulses palpated, no evidence of clubbing.  


NEUROLOGIC: Patient is awake, alert and oriented x3.


 








Results





 02/15/19 04:15





 02/15/19 04:15


 Cardiac Enzymes











  02/15/19 02/15/19 02/15/19 Range/Units





  04:15 04:15 10:37 


 


AST  20    (17-59)  U/L


 


CK-MB (CK-2)   0.9  0.8  (0.0-2.4)  ng/mL


 


Troponin I   <0.012  <0.012  (0.000-0.034)  ng/mL








 Coagulation











  02/15/19 Range/Units





  04:15 


 


PT  10.9  (9.0-12.0)  sec


 


APTT  23.6  (22.0-30.0)  sec








 CBC











  02/15/19 Range/Units





  04:15 


 


WBC  9.1  (3.8-10.6)  k/uL


 


RBC  4.66  (4.30-5.90)  m/uL


 


Hgb  14.0  (13.0-17.5)  gm/dL


 


Hct  43.4  (39.0-53.0)  %


 


Plt Count  299  (150-450)  k/uL








 Comprehensive Metabolic Panel











  02/15/19 Range/Units





  04:15 


 


Sodium  142  (137-145)  mmol/L


 


Potassium  4.0  (3.5-5.1)  mmol/L


 


Chloride  100  ()  mmol/L


 


Carbon Dioxide  35 H  (22-30)  mmol/L


 


BUN  13  (9-20)  mg/dL


 


Creatinine  0.99  (0.66-1.25)  mg/dL


 


Glucose  107 H  (74-99)  mg/dL


 


Calcium  9.3  (8.4-10.2)  mg/dL


 


AST  20  (17-59)  U/L


 


ALT  32  (21-72)  U/L


 


Alkaline Phosphatase  45  ()  U/L


 


Total Protein  6.8  (6.3-8.2)  g/dL


 


Albumin  3.7  (3.5-5.0)  g/dL








 Current Medications











Generic Name Dose Route Start Last Admin





  Trade Name Freq  PRN Reason Stop Dose Admin


 


Amitriptyline HCl  25 mg  02/15/19 21:00  





  Elavil  PO   





  HS LEOLA   





     





     





     





     


 


Amlodipine Besylate  5 mg  02/15/19 09:00  02/15/19 12:18





  Norvasc  PO   5 mg





  DAILY LEOLA   Administration





     





     





     





     


 


Aspirin  325 mg  19 09:00  





  Aspirin  PO   





  DAILY LEOLA   





     





     





     





     


 


Baclofen  5 mg  02/15/19 07:50  





  Lioresal  PO   





  TID PRN   





  Spasms   





     





     





     


 


Carvedilol  25 mg  02/15/19 09:00  02/15/19 12:18





  Coreg  PO   25 mg





  BID-W/MEALS LEOLA   Administration





     





     





     





     


 


Famotidine  40 mg  02/15/19 21:00  





  Pepcid  PO   





  HS LEOLA   





     





     





     





     


 


Fenofibrate  160 mg  02/15/19 09:00  02/15/19 12:18





  Lofibra  PO   160 mg





  DAILY LEOLA   Administration





     





     





     





     


 


Lorazepam  0.5 mg  02/15/19 07:50  





  Ativan  PO   





  Q6H PRN   





  Anxiety   





     





     





     


 


Melatonin  3 mg  02/15/19 07:50  





  Melatonin  PO   





  HS PRN   





  sleep   





     





     





     


 


Nitroglycerin  0.4 mg  02/15/19 07:47  





  Nitrostat  SUBLINGUAL   





  Q5M PRN   





  Chest Pain   





     





     





     


 


Paroxetine HCl  40 mg  02/15/19 09:00  02/15/19 12:19





  Paxil  PO   40 mg





  DAILY LEOLA   Administration





     





     





     





     


 


Tamsulosin HCl  0.8 mg  02/15/19 21:00  





  Flomax  PO   





  HS LEOLA   





     





     





     





     


 


Trazodone HCl  200 mg  02/15/19 21:00  





  Desyrel  PO   





  HS LEOLA   





     





     





     





     








 Intake and Output











 02/14/19 02/15/19 02/15/19





 22:59 06:59 14:59


 


Intake Total   458


 


Balance   458


 


Intake:   


 


  Oral   458


 


Other:   


 


  Weight  129.274 kg 








 





 02/15/19 04:15 





 02/15/19 04:15 











- EKG Interpretation


EKG: sinus rhythm





Assessment and Plan


(1) Chest pain


Current Visit: No   Status: Acute   Code(s): R07.9 - CHEST PAIN, UNSPECIFIED   

SNOMED Code(s): 72299722


   





(2) Hyperlipidemia


Current Visit: No   Status: Acute   Code(s): E78.5 - HYPERLIPIDEMIA, 

UNSPECIFIED   SNOMED Code(s): 34224074


   





(3) Hypertension


Current Visit: No   Status: Acute   Code(s): I10 - ESSENTIAL (PRIMARY) 

HYPERTENSION   SNOMED Code(s): 06390833


   


Plan: 


Plan for continued same medical/medication regime. Atypical chest pain. 

Awaiting additional troponin level. Pt able to eat cardiac diet. Plan of care 

discussed with patient. Will follow.

## 2019-02-15 NOTE — CT
EXAM:

  CT Angiography Chest With Intravenous Contrast

 

CLINICAL HISTORY:

  Chest pain

 

TECHNIQUE:

  Axial computed tomographic angiography images of the chest with 

intravenous contrast using pulmonary embolism protocol.  CTDI is 28.3 mGy 

and DLP is 697.2 mGy-cm.  This CT exam was performed using one or more of 

the following dose reduction techniques: automated exposure control, 

adjustment of the mA and/or kV according to patient size, and/or use of 

iterative reconstruction technique.

  MIP reconstructed images were created and reviewed.

  Coronal and sagittal reformatted images were created and reviewed.

 

COMPARISON:

  No relevant prior studies available.

 

FINDINGS:

  Pulmonary arteries:  Unremarkable.  No pulmonary embolism.

  Aorta:  No acute findings.  No thoracic aortic aneurysm.

  Lungs:  Unremarkable.  No mass.  No consolidation.

  Pleural space:  Unremarkable.  No significant effusion.  No 

pneumothorax.

  Heart:  Unremarkable.  No cardiomegaly.  No significant pericardial 

effusion.  No evidence of RV dysfunction.

  Bones/joints:  Moderate degenerative changes.  Suspect osteopenia.  

  Soft tissues:  Unremarkable.

  Lymph nodes:  Unremarkable.  No enlarged lymph nodes.

  Visualized abdomen: Questionable fatty liver.

 

IMPRESSION:     

  No pulmonary embolism.  No thoracic aortic aneurysm or dissection.  No 

acute findings

## 2019-02-16 VITALS — TEMPERATURE: 98.2 F | DIASTOLIC BLOOD PRESSURE: 84 MMHG | SYSTOLIC BLOOD PRESSURE: 148 MMHG | RESPIRATION RATE: 16 BRPM

## 2019-02-16 VITALS — HEART RATE: 63 BPM

## 2019-02-16 LAB
ANION GAP SERPL CALC-SCNC: 7 MMOL/L
BUN SERPL-SCNC: 16 MG/DL (ref 9–20)
CALCIUM SPEC-MCNC: 8.9 MG/DL (ref 8.4–10.2)
CHLORIDE SERPL-SCNC: 99 MMOL/L (ref 98–107)
CHOLEST SERPL-MCNC: 155 MG/DL (ref ?–200)
CO2 SERPL-SCNC: 32 MMOL/L (ref 22–30)
GLUCOSE SERPL-MCNC: 98 MG/DL (ref 74–99)
HDLC SERPL-MCNC: 45 MG/DL (ref 40–60)
LDLC SERPL CALC-MCNC: 87 MG/DL (ref 0–99)
POTASSIUM SERPL-SCNC: 3.8 MMOL/L (ref 3.5–5.1)
SODIUM SERPL-SCNC: 138 MMOL/L (ref 137–145)
TRIGL SERPL-MCNC: 117 MG/DL (ref ?–150)

## 2019-02-16 RX ADMIN — FENOFIBRATE SCH MG: 160 TABLET ORAL at 08:17

## 2019-02-16 RX ADMIN — CARVEDILOL SCH MG: 12.5 TABLET, FILM COATED ORAL at 08:17

## 2019-02-16 RX ADMIN — HEPARIN SODIUM SCH UNIT: 5000 INJECTION, SOLUTION INTRAVENOUS; SUBCUTANEOUS at 08:17

## 2019-02-16 NOTE — PN
PROGRESS NOTE



This patient was admitted with atypical chest pain.  EKGs and cardiac enzymes are

normal.  Patient's echocardiogram revealed normal left ventricular systolic function.



The patient's blood pressure now is 155/88 mmHg.  First and second heart sounds are

normal.  Lungs are clinically clear to auscultation and percussion.



We will continue the patient on the current medications.  He will be discharged home

and follow up with Dr. Denson.





MMODL / IJN: [id number] / Job#: [id number]

## 2019-02-16 NOTE — P.DS
Providers


Date of admission: 


02/15/19 07:47





Attending physician: 


César Pedroza





Consults: 





 





02/15/19 07:47


Consult Physician Routine 


   Consulting Provider: Miguel A Sanchez


   Consult Reason/Comments: chest pain


   Do you want consulting provider notified?: Yes











Primary care physician: 


Michael Shelly





[facility] Course: 





This is a pleasant 68 years old male with past medical history of asthma/COPD, 

hyperlipidemia, hypertension, TIA, osteoarthritis, bilateral leg edema, 

restless leg syndrome, chronic low back pain and bilateral sciatica


Who presents because of chest pain, patient woke up 3 am in the morning with 

filling of needles in the left upper chest, nonradiating , nonspecific in 

severity, he had similar episodes about 6 months ago and 2 years ago and have 

been told this is from gas.  No associated nausea vomiting, no sweating, no 

dyspnea.  However patient states that when he shelved the snow 2 days ago he 

felt short of breath.


On admission patient was hypertensive.  He has elevated d-dimer with negative 

CTPA for pulmonary embolism.  Chest x-ray: Possible vascular congestion.  He 

received 1 dose of Lasix and he is not dyspneic currently.  Patient has been 

evaluated by cardiologist and cleared him for discharge.  Patient is currently 

chest pain-free and he denies any other symptoms.  Hydralazine has been added 

for better blood pressure controlled.  Blood pressure is 155/82, compared to 

yesterday 180/82


Problems and management plan was discussed with the patient and he verbalized 

understanding and acceptance


Patient was found stable and can be discharged home however he needs follow-up 

as an outpatient.  Appointment was made with cardiology as an outpatient and 

patient agrees with the appointments and Juan and he said he will follow-up





Gen: patient is a AAOx3, no distress


CVS: S1-S2, RRR, no murmur


Lungs: B/L CTA, no wheezing


Abdomen: soft, no distention, no tenderness, positive bowel sounds


Extremity: no leg edema or induration





Time spent more than 35 minutes








Plan - Discharge Summary


Discharge Rx Participant: No


New Discharge Prescriptions: 


New


   hydrALAZINE HCL [Apresoline] 25 mg PO BID #60 tab


   Nitroglycerin Sl Tabs [Nitrostat] 0.4 mg SUBLINGUAL Q5M PRN #20 tab


     PRN Reason: Chest Pain





Continue


   Fenofibrate,Micronized [Fenofibrate] 134 mg PO HS


   Ranitidine HCl [Zantac] 300 mg PO HS


   Tamsulosin HCl [Flomax] 0.8 mg PO HS


   Aspirin [Adult Low Dose Aspirin EC] 81 mg PO QAM


   Melatonin 3 mg PO HS PRN


     PRN Reason: sleep


   traZODone HCL [Desyrel] 200 mg PO HS


   Baclofen 5 mg PO TID PRN #1


     PRN Reason: Spasms


   LORazepam [Ativan] 0.5 mg PO Q6H PRN


     PRN Reason: Anxiety


   Fluticasone Nasal Spray [Flonase Nasal Spray] 1 spray EA NOSTRIL DAILY


   Carvedilol [Coreg] 25 mg PO BID


   Amitriptyline HCl [Elavil] 25 mg PO HS


   amLODIPine [Norvasc] 5 mg PO HS


   PARoxetine HCL [Paxil] 40 mg PO DAILY





Discontinued


   Naproxen [Naprosyn] 250 mg PO BID PRN #20 tab


     PRN Reason: Pain


Discharge Medication List





Fenofibrate,Micronized [Fenofibrate] 134 mg PO HS 03/03/15 [History]


Ranitidine HCl [Zantac] 300 mg PO HS 11/16/17 [History]


Tamsulosin HCl [Flomax] 0.8 mg PO HS 11/16/17 [History]


Aspirin [Adult Low Dose Aspirin EC] 81 mg PO QAM 01/25/18 [History]


Melatonin 3 mg PO HS PRN 01/25/18 [History]


traZODone HCL [Desyrel] 200 mg PO HS 05/31/18 [History]


Baclofen 5 mg PO TID PRN #1 06/02/18 [Rx]


Amitriptyline HCl [Elavil] 25 mg PO HS 08/12/18 [History]


Carvedilol [Coreg] 25 mg PO BID 08/12/18 [History]


Fluticasone Nasal Spray [Flonase Nasal Spray] 1 spray EA NOSTRIL DAILY 08/12/18 

[History]


LORazepam [Ativan] 0.5 mg PO Q6H PRN 08/12/18 [History]


PARoxetine HCL [Paxil] 40 mg PO DAILY 02/15/19 [History]


amLODIPine [Norvasc] 5 mg PO HS 02/15/19 [History]


Nitroglycerin Sl Tabs [Nitrostat] 0.4 mg SUBLINGUAL Q5M PRN #20 tab 02/16/19 [Rx

]


hydrALAZINE HCL [Apresoline] 25 mg PO BID #60 tab 02/16/19 [Rx]








Follow up Appointment(s)/Referral(s): 


Michael Bravo MD [Primary Care Provider] - 1-2 days


Usha Denson MD [STAFF PHYSICIAN] - 02/20/19 1:15 pm (Keep same 

appointment as previously scheduled with Dr. Denson at the [facility])


Activity/Diet/Wound Care/Special Instructions: 


Cardiac diet








Activity is limited till you see your doctor 


Discharge Disposition: HOME SELF-CARE